# Patient Record
Sex: FEMALE | Race: BLACK OR AFRICAN AMERICAN | Employment: OTHER | URBAN - METROPOLITAN AREA
[De-identification: names, ages, dates, MRNs, and addresses within clinical notes are randomized per-mention and may not be internally consistent; named-entity substitution may affect disease eponyms.]

---

## 2017-05-23 ENCOUNTER — OFFICE VISIT (OUTPATIENT)
Dept: ORTHOPEDIC SURGERY | Age: 67
End: 2017-05-23

## 2017-05-23 VITALS
TEMPERATURE: 97.9 F | HEIGHT: 60 IN | WEIGHT: 132 LBS | SYSTOLIC BLOOD PRESSURE: 112 MMHG | DIASTOLIC BLOOD PRESSURE: 75 MMHG | BODY MASS INDEX: 25.91 KG/M2 | HEART RATE: 60 BPM

## 2017-05-23 DIAGNOSIS — M25.512 LEFT SHOULDER PAIN, UNSPECIFIED CHRONICITY: ICD-10-CM

## 2017-05-23 DIAGNOSIS — M75.02 ADHESIVE CAPSULITIS OF LEFT SHOULDER: Primary | ICD-10-CM

## 2017-05-23 RX ORDER — MELOXICAM 15 MG/1
15 TABLET ORAL
Qty: 30 TAB | Refills: 1 | Status: CANCELLED | OUTPATIENT
Start: 2017-05-23

## 2017-05-23 NOTE — PROGRESS NOTES
Reji Michael  1950   Chief Complaint   Patient presents with    Shoulder Pain     Left        HISTORY OF PRESENT ILLNESS  Reji Michael is a 61 y.o. female who presents today for evaluation of left shoulder pain. She rates her pain 7/10 today. Patient was referred by Dr. Joel Lema for further evaluation. She has been experiencing pain since February. She denies injury or trauma. She has pain with overhead reaching. She has a pulley at home and has been using that but it seems to make it worse. She notes pain at night. Not on File     Past Medical History:   Diagnosis Date    Arthritis     Hypertension       Social History     Social History    Marital status:      Spouse name: N/A    Number of children: N/A    Years of education: N/A     Occupational History    Not on file. Social History Main Topics    Smoking status: Never Smoker    Smokeless tobacco: Not on file    Alcohol use No    Drug use: No    Sexual activity: Not on file     Other Topics Concern    Not on file     Social History Narrative    No narrative on file      Past Surgical History:   Procedure Laterality Date    FOOT/TOES SURGERY PROC UNLISTED        History reviewed. No pertinent family history. No current outpatient prescriptions on file. No current facility-administered medications for this visit. REVIEW OF SYSTEM   Patient denies: Weight loss, Fever/Chills, HA, Visual changes, Fatigue, Chest pain, SOB, Abdominal pain, N/V/D/C, Blood in stool or urine, Edema. Pertinent positive as above in HPI. All others were negative    PHYSICAL EXAM:   Visit Vitals    /75    Pulse 60    Temp 97.9 °F (36.6 °C) (Oral)    Ht 4' 11.5\" (1.511 m)    Wt 132 lb (59.9 kg)    BMI 26.21 kg/m2     The patient is a well-developed, well-nourished female   in no acute distress. The patient is alert and oriented times three. The patient is alert and oriented times three.  Mood and affect are normal.  LYMPHATIC: lymph nodes are not enlarged and are within normal limits  SKIN: normal in color and non tender to palpation. There are no bruises or abrasions noted. NEUROLOGICAL: Motor sensory exam is within normal limits. Reflexes are equal bilaterally. There is normal sensation to pinprick and light touch  MUSCULOSKELETAL:  Examination Left shoulder   Skin Intact   AC joint tenderness -   Biceps tenderness -   Forward flexion/Elevation    Active abduction    Glenohumeral abduction 70   External rotation ROM 30   Internal rotation ROM 30   Apprehension -   Husseins Relocation -   Jerk -   Load and Shift -   Obriens -   Speeds -   Impingement sign +   Supraspinatus/Empty Can -, 5/5   External Rotation Strength -, 5/5   Lift Off/Belly Press -, 5/5   Neurovascular Intact        IMAGING: XR of the left shoulder dated 5/23/17 was reviewed and read: Sclerotic changes in the greater tuberosity. IMPRESSION:      ICD-10-CM ICD-9-CM    1. Adhesive capsulitis of left shoulder M75.02 726.0 REFERRAL TO PHYSICAL THERAPY   2. Left shoulder pain, unspecified chronicity M25.512 719.41 AMB POC XRAY, SHOULDER; COMPLETE, 2+        PLAN: Patient is experiencing a significant amount of stiffness in the shoulder. I discussed with her that we need to improve the mobility of the shoulder. She will be referred for a short course of PT. She will take Mobic. I will see her back in 4 weeks for reevaluation. Office note will be sent to the referring provider. Follow-up Disposition: Not on File    Scribed by Donna Bell [facility]) as dictated by Maura Matthews MD    I, Dr. Maura Matthews, confirm that all documentation is accurate.     Maura Matthews M.D.   [facility]

## 2017-06-01 ENCOUNTER — HOSPITAL ENCOUNTER (OUTPATIENT)
Dept: PHYSICAL THERAPY | Age: 67
Discharge: HOME | End: 2017-06-01
Payer: COMMERCIAL

## 2017-06-01 PROCEDURE — 97162 PT EVAL MOD COMPLEX 30 MIN: CPT | Performed by: PHYSICAL THERAPIST

## 2017-06-01 PROCEDURE — 97110 THERAPEUTIC EXERCISES: CPT | Performed by: PHYSICAL THERAPIST

## 2017-06-01 PROCEDURE — 97140 MANUAL THERAPY 1/> REGIONS: CPT | Performed by: PHYSICAL THERAPIST

## 2017-06-01 NOTE — PROGRESS NOTES
PT DAILY TREATMENT NOTE     Patient Name: Valery Armijo  Date:2017  : 1950  [x]  Patient  Verified  Payor: Patrick Toney / Plan: [employer] HMO / Product Type: HMO /    In time:1136  Out time:1215  Total Treatment Time (min): 39  Visit #: 1 of 12    Treatment Area: Adhesive capsulitis of left shoulder [M75.02]    SUBJECTIVE  Pain Level (0-10 scale): 5/10  Any medication changes, allergies to medications, adverse drug reactions, diagnosis change, or new procedure performed?: [x] No    [] Yes (see summary sheet for update)  Subjective functional status/changes:   [] No changes reported  HPI: Pt c/o increased pain and limited ROM in the L shoulder beginning in 2017 w/o known TRAVIS. Reports she woke up one day and couldn't raise her arm. Reports x-rays show frozen shoulder. Reports she was lifting some weights to try and help but that she thinks it may have made it worse. Reports pain increases w/ raising her arm, dressing, and sleeping on the L side. Reports pain decreases w/ rest and not using it. States she is R hand dominant. Denies any previous injection or injury to the L shoulder. Pt is retired and lives with her  who is also attending therapy and needs her help.      OBJECTIVE    Modality rationale: decrease pain and increase tissue extensibility to improve the patients ability to improve mobility and function    Min Type Additional Details    [] Estim:  []Unatt       []IFC  []Premod                        []Other:  []w/ice   []w/heat  Position:  Location:    [] Estim: []Att    []TENS instruct  []NMES                    []Other:  []w/US   []w/ice   []w/heat  Position:  Location:    []  Traction: [] Cervical       []Lumbar                       [] Prone          []Supine                       []Intermittent   []Continuous Lbs:  [] before manual  [] after manual    []  Ultrasound: []Continuous   [] Pulsed                           []1MHz   []3MHz W/cm2:  Location:    []  Iontophoresis with dexamethasone         Location: [] Take home patch   [] In clinic   10 (w/ HEP instruction) []  Ice     [x]  heat  []  Ice massage  []  Laser   []  Anodyne Position: supine  Location: L shoulder    []  Laser with stim  []  Other:  Position:  Location:    []  Vasopneumatic Device Pressure:       [] lo [] med [] hi   Temperature: [] lo [] med [] hi   [] Skin assessment post-treatment:  []intact []redness- no adverse reaction    []redness - adverse reaction:     19 min [x]Eval                  []Re-Eval       10 min Therapeutic Exercise:  [] See flow sheet : instructed in and demo'd HEP, educated on frozen shoulder and expectations for PT   Rationale: increase ROM, increase strength, improve coordination and increase proprioception to improve the patients ability to decrease pain and improve reaching     min Therapeutic Activity:  []  See flow sheet :   Rationale:   to improve the patients ability to       min Neuromuscular Re-education:  []  See flow sheet :   Rationale:   to improve the patients ability to     10 min Manual Therapy:  STJ mobs, STM to parascapular mm, GH jt mobs inf and post, subscap release, manual str into all planes   Rationale: decrease pain, increase ROM, increase tissue extensibility, decrease trigger points and increase postural awareness to improve dressing and reaching     min Gait Training:  ___ feet with ___ device on level surfaces with ___ level of assist   Rationale: With   [] TE   [] TA   [] neuro   [] other: Patient Education: [x] Review HEP    [] Progressed/Changed HEP based on:   [] positioning   [] body mechanics   [] transfers   [] heat/ice application    [] other:      Other Objective/Functional Measures: Pt presents w/ AROM L shoulder flex 110 w/ UT compensation, scap 85 w/ pain, IR to L1 w/ pain, ER to occiput w/ compensations. PROM L shoulder flex 110 deg, scar 90 deg, ER (at 45 deg abd) 45 deg, IR (at 45 deg abd) 30 deg. Increased tightness in the parascapular mm, subscap, UT, LS, and scalenes. Poor GH joint mobility noted. Pain Level (0-10 scale) post treatment: 6-7/10    ASSESSMENT/Changes in Function: Focus on improving AROM and mobility to assist in returning pt to (I) dressing and reaching. Patient will continue to benefit from skilled PT services to modify and progress therapeutic interventions, address functional mobility deficits, address ROM deficits, address strength deficits, analyze and address soft tissue restrictions, analyze and cue movement patterns, analyze and modify body mechanics/ergonomics and instruct in home and community integration to attain remaining goals. [x]  See Plan of Care  []  See progress note/recertification  []  See Discharge Summary         Progress towards goals / Updated goals:  Short Term Goals: To be accomplished in 1 weeks:  1. Pt will be independent and compliant w/ HEP to progress gains in PT. At eval: initiated HEP  Long Term Goals: To be accomplished in 6 weeks:  1. Pt will improve FOTO to > or = to 64 to demo improved function. At eval: FOTO = 41  2. Pt will increase L shoulder PROM to > or = to 150 deg flex and scap for ease w/ improving reaching. At eval: PROM L shoulder flex 110 deg, scap 90 deg  3. Pt will increase L shoulder AROM to > or = to 130 deg flex and scap for ease w/ dressing. At eval: AROM L shoulder flex 110 w/ UT compensation, scap 85 w/ pain  4. Pt will increase L shoulder AROM into ER to T1 w/o compensation for ease w/ doing her hair.    At eval: L shoulder ER to occiput w/ compensations    PLAN  []  Upgrade activities as tolerated     []  Continue plan of care  []  Update interventions per flow sheet       []  Discharge due to:_  [x]  Other: 2x/6 weeks (pt is unable to come 3x/week 2' fixed income)    Raissa Hong, PT 6/1/2017  12:18 PM    Future Appointments  Date Time Provider Brodie Gallardo   6/20/2017 1:20 PM Esperanza Valadez MD KAL Puri

## 2017-06-01 NOTE — PROGRESS NOTES
[facility] - [facility]              [address]        [place], [address] [place]   [phone] fax    Plan of Care/ Statement of Necessity for Physical Therapy Services  Patient name: Carmen Espinoza Start of Care: 2017   Referral source: Leonarda Monge,* : 1950    Medical Diagnosis: Adhesive capsulitis of left shoulder [M75.02]   Onset Date:2017    Treatment Diagnosis: L adhesive capsulitis   Prior Hospitalization: see medical history Provider#: [id number]   Medications: Verified on Patient summary List    Comorbidities: arthritis, HBP   Prior Level of Function: Independent w/ ADLs and reaching, no L shoulder pain, R hand dominant     The Plan of Care and following information is based on the information from the initial evaluation. Assessment/ key information: Pt is a 60 y/o female w/ c/o increased pain and limited ROM in the L shoulder beginning in 2017 w/o known TRAVIS. Reports she woke up one day and couldn't raise her arm. Reports x-rays show frozen shoulder. Reports she was lifting some weights to try and help but that she thinks it may have made it worse. Reports pain increases w/ raising her arm, dressing, and sleeping on the L side. Reports pain decreases w/ rest and not using it. States she is R hand dominant. Denies any previous injection or injury to the L shoulder. Pt is retired and lives with her  who is also attending therapy and needs her help. Pt presents w/ AROM L shoulder flex 110 w/ UT compensation, scap 85 w/ pain, IR to L1 w/ pain, ER to occiput w/ compensations. PROM L shoulder flex 110 deg, scar 90 deg, ER (at 45 deg abd) 45 deg, IR (at 45 deg abd) 30 deg. Increased tightness in the parascapular mm, subscap, UT, LS, and scalenes. Poor GH joint mobility noted. Pt will benefit from skilled PT to address the deficits and progress with pt goals.       Evaluation Complexity History MEDIUM  Complexity : 1-2 comorbidities / personal factors will impact the outcome/ POC ; Examination MEDIUM Complexity : 3 Standardized tests and measures addressing body structure, function, activity limitation and / or participation in recreation  ;Presentation MEDIUM Complexity : Evolving with changing characteristics  ; Clinical Decision Making MEDIUM Complexity : FOTO score of 26-74  Overall Complexity Rating: MEDIUM  Problem List: pain affecting function, decrease ROM, decrease strength, decrease ADL/ functional abilitiies, decrease activity tolerance and decrease flexibility/ joint mobility   Treatment Plan may include any combination of the following: Therapeutic exercise, Therapeutic activities, Neuromuscular re-education, Physical agent/modality, Manual therapy, Patient education and Functional mobility training  Patient / Family readiness to learn indicated by: asking questions, trying to perform skills and interest  Persons(s) to be included in education: patient (P)  Barriers to Learning/Limitations: None  Patient Goal (s): hope for my shoulder to be back to normal  Patient Self Reported Health Status: good  Rehabilitation Potential: good    Short Term Goals: To be accomplished in 1 weeks:  1. Pt will be independent and compliant w/ HEP to progress gains in PT. Long Term Goals: To be accomplished in 6 weeks:  1. Pt will improve FOTO to > or = to 64 to demo improved function. 2. Pt will increase L shoulder PROM to > or = to 150 deg flex and scap for ease w/ improving reaching. 3. Pt will increase L shoulder AROM to > or = to 130 deg flex and scap for ease w/ dressing. 4. Pt will increase L shoulder AROM into ER to T1 w/o compensation for ease w/ doing her hair. Frequency / Duration: Patient to be seen 2 times per week for 6 weeks.     Patient/ Caregiver education and instruction: Diagnosis, prognosis, activity modification and exercises   [x]  Plan of care has been reviewed with HEAVENLY Evans, PT 6/1/2017 3:24 PM  ________________________________________________________________________    I certify that the above Therapy Services are being furnished while the patient is under my care. I agree with the treatment plan and certify that this therapy is necessary.     [facility] Signature:____________________  Date:____________Time: _________    Please sign and return to [facility] - [address]        [place], [facility]   [phone] fax

## 2017-06-06 ENCOUNTER — HOSPITAL ENCOUNTER (OUTPATIENT)
Dept: PHYSICAL THERAPY | Age: 67
Discharge: HOME | End: 2017-06-06
Payer: COMMERCIAL

## 2017-06-06 PROCEDURE — 97110 THERAPEUTIC EXERCISES: CPT

## 2017-06-06 PROCEDURE — 97140 MANUAL THERAPY 1/> REGIONS: CPT

## 2017-06-06 NOTE — PROGRESS NOTES
PT DAILY TREATMENT NOTE 3-16    Patient Name: Jim Tobias  Date:2017  : 1950  [x]  Patient  Verified  Payor: Rossana Baxter / Plan: [employer] HMO / Product Type: HMO /    In time:2:25  Out time:3:24  Total Treatment Time (min): 59  Visit #: 2 of 12    Treatment Area: Adhesive capsulitis of left shoulder [M75.02]    SUBJECTIVE  Pain Level (0-10 scale): 4  Any medication changes, allergies to medications, adverse drug reactions, diagnosis change, or new procedure performed?: [x] No    [] Yes (see summary sheet for update)  Subjective functional status/changes:   [] No changes reported  Patient reports compliance with HEP.      OBJECTIVE  Modality rationale: decrease pain and increase tissue extensibility to improve the patients ability to ease soreness after therapy   Min Type Additional Details    [] Estim:  []Unatt       []IFC  []Premod                        []Other:  []w/ice   []w/heat  Position:  Location:    [] Estim: []Att    []TENS instruct  []NMES                    []Other:  []w/US   []w/ice   []w/heat  Position:  Location:    []  Traction: [] Cervical       []Lumbar                       [] Prone          []Supine                       []Intermittent   []Continuous Lbs:  [] before manual  [] after manual    []  Ultrasound: []Continuous   [] Pulsed                           []1MHz   []3MHz Location:  W/cm2:    []  Iontophoresis with dexamethasone         Location: [] Take home patch   [] In clinic   10 []  Ice     [x]  heat  []  Ice massage  []  Laser   []  Anodyne Position: seated  Location: left shoulder    []  Laser with stim  []  Other: Position:  Location:    []  Vasopneumatic Device Pressure:       [] lo [] med [] hi   Temperature: [] lo [] med [] hi   [x] Skin assessment post-treatment:  [x]intact []redness- no adverse reaction    []redness - adverse reaction:     39 min Therapeutic Exercise:  [x] See flow sheet :   Rationale: increase ROM, increase strength and improve coordination to improve the patients ability to increase ease with ADLs      10 min Manual Therapy:  Left STJ mobs, STM/TPR to paraspcaular musculature and subscap, left GHJ grade II posterior mobs in flexion, grade II inferior mobs in abd,    Rationale: decrease pain, increase ROM and increase tissue extensibility to ease ADL tolerance             With   [] TE   [] TA   [] neuro   [] other: Patient Education: [x] Review HEP    [] Progressed/Changed HEP based on:   [] positioning   [] body mechanics   [] transfers   [] heat/ice application    [] other:      Other Objective/Functional Measures: first follow up session  Maximal cueing to perform pulley's to tolerable range as patient frequently presents with pained facial expression and verbally expresses discomfort, patient with poor compliance, frequently stating \"I can take it\"  Patient intermittently stops mid reps due to c/o pain, therapist provides cueing to hold chicken wing exercise due to patient's distress, however, patient reports \"I can do it\" and proceeds to complete reps despite therapist's instruction  Reports increase in pain with exercises, 7/10    Pain Level (0-10 scale) post treatment: 0    ASSESSMENT/Changes in Function: Initiated POC per flowsheet. Maximal cueing to perform ROM exercises to tolerable range as patient verbally/facially expresses pain. Patient has increase in pain with exercises, however, post modalities, reports no pain. Will continue to focus on increasing ROM for ease of OH reaching. Patient will continue to benefit from skilled PT services to modify and progress therapeutic interventions, address functional mobility deficits, address ROM deficits, address strength deficits, analyze and address soft tissue restrictions, analyze and cue movement patterns, analyze and modify body mechanics/ergonomics and assess and modify postural abnormalities to attain remaining goals.      []  See Plan of Care  []  See progress note/recertification  []  See Discharge Summary         Short Term Goals: To be accomplished in 1 weeks:  1. Pt will be independent and compliant w/ HEP to progress gains in PT. -met per patient report (6/6/2017)     Long Term Goals: To be accomplished in 6 weeks:  1. Pt will improve FOTO to > or = to 64 to demo improved function. 2. Pt will increase L shoulder PROM to > or = to 150 deg flex and scap for ease w/ improving reaching. 3. Pt will increase L shoulder AROM to > or = to 130 deg flex and scap for ease w/ dressing. 4. Pt will increase L shoulder AROM into ER to T1 w/o compensation for ease w/ doing her hair.      PLAN  []  Upgrade activities as tolerated     [x]  Continue plan of care  []  Update interventions per flow sheet       []  Discharge due to:_  []  Other:_      Suni Marquez 6/6/2017  9:18 AM    Future Appointments  Date Time Provider Brodie Gallardo   6/6/2017 2:30 PM Suni Marquez [facility]   6/9/2017 12:00 PM Evelynlorie Gardner, PTA [facility]   6/13/2017 9:30 AM Leesa Joshi, PTA [facility]   6/16/2017 12:00 PM Leesa Joshi PTA [facility]   6/20/2017 1:20 PM [facility]   6/23/2017 12:00 PM Evelyn E Laws, PTA [facility]   6/27/2017 9:30 AM [facility] PT SUFFOLK 1 [facility]   6/29/2017 11:00 AM Evelyn E Laws, PTA [facility]   7/3/2017 10:30 AM Evelyn E Laws, PTA [facility]   7/7/2017 10:30 AM Evelyn E Laws, PTA [facility]

## 2017-06-09 ENCOUNTER — HOSPITAL ENCOUNTER (OUTPATIENT)
Dept: PHYSICAL THERAPY | Age: 67
Discharge: HOME | End: 2017-06-09
Payer: COMMERCIAL

## 2017-06-09 PROCEDURE — 97140 MANUAL THERAPY 1/> REGIONS: CPT

## 2017-06-09 PROCEDURE — 97110 THERAPEUTIC EXERCISES: CPT

## 2017-06-09 NOTE — PROGRESS NOTES
PT DAILY TREATMENT NOTE     Patient Name: Cb Cohen  Date:2017  : 1950  [x]  Patient  Verified  Payor: Lyric Montemayor / Plan: [employer] HMO / Product Type: HMO /    In time: 11:53  Out time:12:56  Total Treatment Time (min): 63  Visit #: 3 of 12    Treatment Area: Adhesive capsulitis of left shoulder [M75.02]    SUBJECTIVE  Pain Level (0-10 scale): 5/10  Any medication changes, allergies to medications, adverse drug reactions, diagnosis change, or new procedure performed?: [x] No    [] Yes (see summary sheet for update)  Subjective functional status/changes:   [] No changes reported  Pt reports increased pain with movement. OBJECTIVE    Modality rationale: decrease pain to improve the patients ability to increase ease of ADLs.     Min Type Additional Details    [] Estim:  []Unatt       []IFC  []Premod                        []Other:  []w/ice   []w/heat  Position:  Location:    [] Estim: []Att    []TENS instruct  []NMES                    []Other:  []w/US   []w/ice   []w/heat  Position:  Location:    []  Traction: [] Cervical       []Lumbar                       [] Prone          []Supine                       []Intermittent   []Continuous Lbs:  [] before manual  [] after manual    []  Ultrasound: []Continuous   [] Pulsed                           []1MHz   []3MHz W/cm2:  Location:    []  Iontophoresis with dexamethasone         Location: [] Take home patch   [] In clinic   10 []  Ice     [x]  heat  []  Ice massage  []  Laser   []  Anodyne Position: supine  Location: L shoulder    []  Laser with stim  []  Other:  Position:  Location:    []  Vasopneumatic Device Pressure:       [] lo [] med [] hi   Temperature: [] lo [] med [] hi   [] Skin assessment post-treatment:  []intact []redness- no adverse reaction    []redness - adverse reaction:     43 min Therapeutic Exercise:  [x] See flow sheet :   Rationale: increase ROM and increase strength to improve the patients ability to perform ADLs. 10 min Manual Therapy:  Per flow sheet   Rationale: decrease pain, increase ROM and increase tissue extensibility to increase ease of ADLs. With   [] TE   [] TA   [] neuro   [] other: Patient Education: [x] Review HEP    [] Progressed/Changed HEP based on:   [] positioning   [] body mechanics   [] transfers   [] heat/ice application    [] other:      Other Objective/Functional Measures: Frequent VCs to decrease muscle guarding during PROM . Restricted scapular mobility. Pain Level (0-10 scale) post treatment: 5/10    ASSESSMENT/Changes in Function: Cont per POC. Patient will continue to benefit from skilled PT services to modify and progress therapeutic interventions, address functional mobility deficits, address ROM deficits and address strength deficits to attain remaining goals. []  See Plan of Care  []  See progress note/recertification  []  See Discharge Summary         Progress towards goals / Updated goals:  Short Term Goals: To be accomplished in 1 weeks:  1. Pt will be independent and compliant w/ HEP to progress gains in PT. At eval: initiated HEP  Current: Met.  6/6/17  Long Term Goals: To be accomplished in 6 weeks:  1. Pt will improve FOTO to > or = to 64 to demo improved function. At eval: FOTO = 41  2. Pt will increase L shoulder PROM to > or = to 150 deg flex and scap for ease w/ improving reaching. At eval: PROM L shoulder flex 110 deg, scap 90 deg  3. Pt will increase L shoulder AROM to > or = to 130 deg flex and scap for ease w/ dressing. At eval: AROM L shoulder flex 110 w/ UT compensation, scap 85 w/ pain  4. Pt will increase L shoulder AROM into ER to T1 w/o compensation for ease w/ doing her hair.    At eval: L shoulder ER to occiput w/ compensations    PLAN  []  Upgrade activities as tolerated     [x]  Continue plan of care  []  Update interventions per flow sheet       []  Discharge due to:_  []  Other:_      Evelyn Gardner, PTA 6/9/2017  1:07 PM    Future Appointments  Date Time Provider Brodie Gallardo   6/13/2017 9:30 AM Verner Seals, PTA [facility]   6/16/2017 12:00 PM Verner Seals, PTA [facility]   6/20/2017 1:20 PM Zabrina Dawson MD [facility]   6/23/2017 12:00 PM Evelyn Gardner, PTA [facility]   6/27/2017 9:30 AM [facility] PT [place] [facility]   6/29/2017 11:00 AM Evelyn Gardner, PTA [facility]   7/3/2017 10:30 AM Evelyn Gardner, PTA [facility]   7/7/2017 10:30 AM Evelyn Gardner, PTA [facility]

## 2017-06-13 ENCOUNTER — HOSPITAL ENCOUNTER (OUTPATIENT)
Dept: PHYSICAL THERAPY | Age: 67
Discharge: HOME | End: 2017-06-13
Payer: COMMERCIAL

## 2017-06-13 PROCEDURE — 97110 THERAPEUTIC EXERCISES: CPT

## 2017-06-13 PROCEDURE — 97140 MANUAL THERAPY 1/> REGIONS: CPT

## 2017-06-13 NOTE — PROGRESS NOTES
PT DAILY TREATMENT NOTE     Patient Name: Ho Calloway  Date:2017  : 1950  [x]  Patient  Verified  Payor: Noah Peña / Plan: [employer] HMO / Product Type: HMO /    In time:9:30  Out time:10:25  Total Treatment Time (min): 55  Visit #: 4 of 12    Treatment Area: Adhesive capsulitis of left shoulder [M75.02]    SUBJECTIVE  Pain Level (0-10 scale): 5  Any medication changes, allergies to medications, adverse drug reactions, diagnosis change, or new procedure performed?: [x] No    [] Yes (see summary sheet for update)  Subjective functional status/changes:   [] No changes reported  Pt reports that she feels that she can reach a little higher than she could before. OBJECTIVE    Modality rationale: decrease pain to improve the patients ability to decrease difficulty while performing tasks.     Min Type Additional Details    [] Estim:  []Unatt       []IFC  []Premod                        []Other:  []w/ice   []w/heat  Position:  Location:    [] Estim: []Att    []TENS instruct  []NMES                    []Other:  []w/US   []w/ice   []w/heat  Position:  Location:    []  Traction: [] Cervical       []Lumbar                       [] Prone          []Supine                       []Intermittent   []Continuous Lbs:  [] before manual  [] after manual    []  Ultrasound: []Continuous   [] Pulsed                           []1MHz   []3MHz W/cm2:  Location:    []  Iontophoresis with dexamethasone         Location: [] Take home patch   [] In clinic   10 []  Ice     [x]  heat  []  Ice massage  []  Laser   []  Anodyne Position:sitting  Location:L shoulder    []  Laser with stim  []  Other:  Position:  Location:    []  Vasopneumatic Device Pressure:       [] lo [] med [] hi   Temperature: [] lo [] med [] hi   [] Skin assessment post-treatment:  []intact []redness- no adverse reaction    []redness - adverse reaction:     30 min Therapeutic Exercise:  [x] See flow sheet :   Rationale: increase ROM and increase strength to improve the patients ability to increase tolerance to activites. 15 min Manual Therapy:  Per flow sheet   Rationale: decrease pain, increase ROM, increase tissue extensibility and decrease trigger points to increase ease with ADLs. With   [] TE   [] TA   [] neuro   [] other: Patient Education: [x] Review HEP    [] Progressed/Changed HEP based on:   [] positioning   [] body mechanics   [] transfers   [] heat/ice application    [] other:      Other Objective/Functional Measures: L shoulder PROM flex 150 deg, scap 150 deg with pain. Pain Level (0-10 scale) post treatment: 5    ASSESSMENT/Changes in Function: Pt has minimal discomfort with supine wand in Abd. Continue per POC. Patient will continue to benefit from skilled PT services to modify and progress therapeutic interventions, address functional mobility deficits, address ROM deficits, address strength deficits and analyze and address soft tissue restrictions to attain remaining goals. []  See Plan of Care  []  See progress note/recertification  []  See Discharge Summary         Progress towards goals / Updated goals:  Short Term Goals: To be accomplished in 1 weeks:  1. Pt will be independent and compliant w/ HEP to progress gains in PT. At eval: initiated HEP  Current: Met. 6/6/17  Long Term Goals: To be accomplished in 6 weeks:  1. Pt will improve FOTO to > or = to 64 to demo improved function. At eval: FOTO = 41  2. Pt will increase L shoulder PROM to > or = to 150 deg flex and scap for ease w/ improving reaching. At eval: PROM L shoulder flex 110 deg, scap 90 deg  Current; met:  L shoulder PROM flex 150 deg, scap 150 deg with pain. 6/13/17  3. Pt will increase L shoulder AROM to > or = to 130 deg flex and scap for ease w/ dressing. At eval: AROM L shoulder flex 110 w/ UT compensation, scap 85 w/ pain  4.  Pt will increase L shoulder AROM into ER to T1 w/o compensation for ease w/ doing her hair.    At eval: L shoulder ER to occiput w/ compensations    PLAN  []  Upgrade activities as tolerated     [x]  Continue plan of care  []  Update interventions per flow sheet       []  Discharge due to:_  []  Other:_      Anabel Mcdonouhg, PTA 6/13/2017  9:45 AM    Future Appointments  Date Time Provider Brodie Gallardo   6/16/2017 12:00 PM Anabel Mcdonough, PTA [facility]   6/20/2017 1:20 PM Mechelle Au MD [facility]   6/23/2017 12:00 PM Evelyn E Laws, PTA [facility]   6/27/2017 9:30 AM [facility] PT [place] [facility]   6/29/2017 11:00 AM Evelyn E Laws, PTA [facility]   7/3/2017 10:30 AM Evelyn E Laws, PTA [facility]   7/7/2017 10:30 AM Evelyn E Laws, PTA [facility]

## 2017-06-16 ENCOUNTER — HOSPITAL ENCOUNTER (OUTPATIENT)
Dept: PHYSICAL THERAPY | Age: 67
Discharge: HOME | End: 2017-06-16
Payer: COMMERCIAL

## 2017-06-16 PROCEDURE — 97110 THERAPEUTIC EXERCISES: CPT

## 2017-06-16 PROCEDURE — 97140 MANUAL THERAPY 1/> REGIONS: CPT

## 2017-06-16 NOTE — PROGRESS NOTES
PT DAILY TREATMENT NOTE     Patient Name: Carmella Mohan  Date:2017  : 1950  [x]  Patient  Verified  Payor: Michele Hayward / Plan: [employer] / Product Type: HMO /    In time:11:43  Out time:12:36  Total Treatment Time (min): 53  Visit #: 5 of 12    Treatment Area: Adhesive capsulitis of left shoulder [M75.02]    SUBJECTIVE  Pain Level (0-10 scale): 4  Any medication changes, allergies to medications, adverse drug reactions, diagnosis change, or new procedure performed?: [x] No    [] Yes (see summary sheet for update)  Subjective functional status/changes:   [] No changes reported  Pt reports that she was not too sore after last visit. OBJECTIVE    Modality rationale: decrease pain to improve the patients ability to decrease difficulty while performing tasks.     Min Type Additional Details    [] Estim:  []Unatt       []IFC  []Premod                        []Other:  []w/ice   []w/heat  Position:  Location:    [] Estim: []Att    []TENS instruct  []NMES                    []Other:  []w/US   []w/ice   []w/heat  Position:  Location:    []  Traction: [] Cervical       []Lumbar                       [] Prone          []Supine                       []Intermittent   []Continuous Lbs:  [] before manual  [] after manual    []  Ultrasound: []Continuous   [] Pulsed                           []1MHz   []3MHz W/cm2:  Location:    []  Iontophoresis with dexamethasone         Location: [] Take home patch   [] In clinic   10 []  Ice     [x]  heat  []  Ice massage  []  Laser   []  Anodyne Position:supine  Location: L shoulder    []  Laser with stim  []  Other:  Position:  Location:    []  Vasopneumatic Device Pressure:       [] lo [] med [] hi   Temperature: [] lo [] med [] hi   [] Skin assessment post-treatment:  []intact []redness- no adverse reaction    []redness - adverse reaction:       28 min Therapeutic Exercise: [x] See flow sheet :   Rationale: increase ROM and increase strength to improve the patients ability to increase tolerance to activites.         15 min Manual Therapy: Per flow sheet   Rationale: decrease pain, increase ROM, increase tissue extensibility and decrease trigger points to increase ease with ADLs. With   [] TE   [] TA   [] neuro   [] other: Patient Education: [x] Review HEP    [] Progressed/Changed HEP based on:   [] positioning   [] body mechanics   [] transfers   [] heat/ice application    [] other:      Other Objective/Functional Measures: Pt reports relief after manual stretching. Pain Level (0-10 scale) post treatment: 3    ASSESSMENT/Changes in Function: Continue per POC. Patient will continue to benefit from skilled PT services to modify and progress therapeutic interventions, address functional mobility deficits, address ROM deficits, address strength deficits and analyze and address soft tissue restrictions to attain remaining goals. []  See Plan of Care  []  See progress note/recertification  []  See Discharge Summary         Progress towards goals / Updated goals:  Short Term Goals: To be accomplished in 1 weeks:  1. Pt will be independent and compliant w/ HEP to progress gains in PT. At eval: initiated HEP  Current: Met. 6/6/17  Long Term Goals: To be accomplished in 6 weeks:  1. Pt will improve FOTO to > or = to 64 to demo improved function. At eval: FOTO = 41  2. Pt will increase L shoulder PROM to > or = to 150 deg flex and scap for ease w/ improving reaching. At eval: PROM L shoulder flex 110 deg, scap 90 deg  Current; met: L shoulder PROM flex 150 deg, scap 150 deg with pain. 6/13/17  3. Pt will increase L shoulder AROM to > or = to 130 deg flex and scap for ease w/ dressing. At eval: AROM L shoulder flex 110 w/ UT compensation, scap 85 w/ pain  4. Pt will increase L shoulder AROM into ER to T1 w/o compensation for ease w/ doing her hair.    At eval: L shoulder ER to occiput w/ compensations       PLAN  []  Upgrade activities as tolerated     [x]  Continue plan of care  []  Update interventions per flow sheet       []  Discharge due to:_  []  Other:_      Twan Acharya, PTA 6/16/2017  12:09 PM    Future Appointments  Date Time Provider Brodie Paulina   6/20/2017 1:20 PM MD [facility] Stephens   6/23/2017 12:00 PM Evelyn Gardner, PTA [facility]   6/27/2017 9:30 AM Zac Lozano PT [facility]   6/29/2017 11:00 AM Evelynlorie Gardner, PTA [facility]   7/3/2017 10:30 AM Evelynlorie Gardner, PTA [facility]   7/7/2017 10:30 AM Evelynlorie Gardner, PTA [facility]

## 2017-06-20 ENCOUNTER — OFFICE VISIT (OUTPATIENT)
Dept: ORTHOPEDIC SURGERY | Age: 67
End: 2017-06-20

## 2017-06-20 VITALS
DIASTOLIC BLOOD PRESSURE: 78 MMHG | BODY MASS INDEX: 26.11 KG/M2 | TEMPERATURE: 98.2 F | WEIGHT: 133 LBS | SYSTOLIC BLOOD PRESSURE: 118 MMHG | HEIGHT: 60 IN

## 2017-06-20 DIAGNOSIS — M75.02 ADHESIVE CAPSULITIS OF LEFT SHOULDER: Primary | ICD-10-CM

## 2017-06-20 DIAGNOSIS — M25.512 LEFT SHOULDER PAIN, UNSPECIFIED CHRONICITY: ICD-10-CM

## 2017-06-20 NOTE — PROGRESS NOTES
Cb Cohen  1950   Chief Complaint   Patient presents with    Shoulder Pain     Left        HISTORY OF PRESENT ILLNESS  Cb Cohen is a 61 y.o. female who presents today for reevaluation of left shoulder pain. She rates her pain 4/10 today. Patient has attended about 4 sessions of PT with some improvement in her mobility. She does feel her pain has improved. She notes pain since February. She denies injury or trauma. She has pain with overhead reaching. She has a pulley at home and has been using that but it seems to make it worse. She notes pain at night. Patient denies any fever, chills, chest pain, shortness of breath or calf pain. There are no new illness or injuries to report since last seen in the office. There are no changes to medications, allergies, family or social history. PHYSICAL EXAM:   Visit Vitals    /78    Temp 98.2 °F (36.8 °C) (Oral)    Ht 4' 11.5\" (1.511 m)    Wt 133 lb (60.3 kg)    BMI 26.41 kg/m2     The patient is a well-developed, well-nourished female   in no acute distress. The patient is alert and oriented times three. The patient is alert and oriented times three. Mood and affect are normal.  LYMPHATIC: lymph nodes are not enlarged and are within normal limits  SKIN: normal in color and non tender to palpation. There are no bruises or abrasions noted. NEUROLOGICAL: Motor sensory exam is within normal limits. Reflexes are equal bilaterally.  There is normal sensation to pinprick and light touch  MUSCULOSKELETAL:  Examination Left shoulder   Skin Intact   AC joint tenderness -   Biceps tenderness -   Forward flexion/Elevation    Active abduction    Glenohumeral abduction 70   External rotation ROM 30   Internal rotation ROM 30   Apprehension -   Husseins Relocation -   Jerk -   Load and Shift -   Obriens -   Speeds -   Impingement sign +   Supraspinatus/Empty Can -, 5/5   External Rotation Strength -, 5/5   Lift Off/Belly Press -, 5/5   Neurovascular Intact          IMAGING: XR of the left shoulder dated 5/23/17 was reviewed and read: Sclerotic changes in the greater tuberosity. IMPRESSION:      ICD-10-CM ICD-9-CM    1. Adhesive capsulitis of left shoulder M75.02 726.0    2. Left shoulder pain, unspecified chronicity M25.512 719.41         PLAN: Patient has been responding well to treatment at PT. Patient does not want to continue going to PT. She will do HEP. I discussed with her that this may be something that slowly resolves over time. She will return as needed. Follow-up Disposition: Not on File    Scribed by Nicole Jerome [facility]) as dictated by Sheridan Boggs MD    I, Dr. Sheridan Boggs, confirm that all documentation is accurate.     Sheridan Boggs M.D.   [facility]

## 2017-06-20 NOTE — PATIENT INSTRUCTIONS
Joint Pain: Care Instructions  Your Care Instructions  Many people have small aches and pains from overuse or injury to muscles and joints. Joint injuries often happen during sports or recreation, work tasks, or projects around the home. An overuse injury can happen when you put too much stress on a joint or when you do an activity that stresses the joint over and over, such as using the computer or rowing a boat. You can take action at home to help your muscles and joints get better. You should feel better in 1 to 2 weeks, but it can take 3 months or more to heal completely. Follow-up care is a key part of your treatment and safety. Be sure to make and go to all appointments, and call your doctor if you are having problems. It's also a good idea to know your test results and keep a list of the medicines you take. How can you care for yourself at home? · Do not put weight on the injured joint for at least a day or two. · For the first day or two after an injury, do not take hot showers or baths, and do not use hot packs. The heat could make swelling worse. · Put ice or a cold pack on the sore joint for 10 to 20 minutes at a time. Try to do this every 1 to 2 hours for the next 3 days (when you are awake) or until the swelling goes down. Put a thin cloth between the ice and your skin. · Wrap the injury in an elastic bandage. Do not wrap it too tightly because this can cause more swelling. · Prop up the sore joint on a pillow when you ice it or anytime you sit or lie down during the next 3 days. Try to keep it above the level of your heart. This will help reduce swelling. · Take an over-the-counter pain medicine, such as acetaminophen (Tylenol), ibuprofen (Advil, Motrin), or naproxen (Aleve). Read and follow all instructions on the label. · After 1 or 2 days of rest, begin moving the joint gently.  While the joint is still healing, you can begin to exercise using activities that do not strain or hurt the painful joint. When should you call for help? Call your doctor now or seek immediate medical care if:  · You have signs of infection, such as:  ¨ Increased pain, swelling, warmth, and redness. ¨ Red streaks leading from the joint. ¨ A fever. Watch closely for changes in your health, and be sure to contact your doctor if:  · Your movement or symptoms are not getting better after 1 to 2 weeks of home treatment. Where can you learn more? Go to http://david-naheed.info/. Enter P205 in the search box to learn more about \"Joint Pain: Care Instructions. \"  Current as of: March 21, 2017  Content Version: 11.3  © 8252-6580 [employer]. Care instructions adapted under license by [employer] (which disclaims liability or warranty for this information). If you have questions about a medical condition or this instruction, always ask your healthcare professional. [address] any warranty or liability for your use of this information.

## 2017-06-23 ENCOUNTER — HOSPITAL ENCOUNTER (OUTPATIENT)
Dept: PHYSICAL THERAPY | Age: 67
Discharge: HOME | End: 2017-06-23
Payer: COMMERCIAL

## 2017-06-23 PROCEDURE — 97110 THERAPEUTIC EXERCISES: CPT

## 2017-06-23 PROCEDURE — 97140 MANUAL THERAPY 1/> REGIONS: CPT

## 2017-06-23 NOTE — PROGRESS NOTES
PT DAILY TREATMENT NOTE     Patient Name: Alissa Guerrero  Date:2017  : 1950  [x]  Patient  Verified  Payor: Yuliana Knox / Plan: [employer] / Product Type: HMO /    In time: 11:57  Out time: 12:54  Total Treatment Time (min): 57  Visit #: 6 of 12    Treatment Area: Adhesive capsulitis of left shoulder [M75.02]    SUBJECTIVE  Pain Level (0-10 scale): 4/10  Any medication changes, allergies to medications, adverse drug reactions, diagnosis change, or new procedure performed?: [x] No    [] Yes (see summary sheet for update)  Subjective functional status/changes:   [] No changes reported  Pt reports she can feel an improvement. OBJECTIVE    Modality rationale: decrease pain to improve the patients ability to increase ease of ADLs.     Min Type Additional Details    [] Estim:  []Unatt       []IFC  []Premod                        []Other:  []w/ice   []w/heat  Position:  Location:    [] Estim: []Att    []TENS instruct  []NMES                    []Other:  []w/US   []w/ice   []w/heat  Position:  Location:    []  Traction: [] Cervical       []Lumbar                       [] Prone          []Supine                       []Intermittent   []Continuous Lbs:  [] before manual  [] after manual    []  Ultrasound: []Continuous   [] Pulsed                           []1MHz   []3MHz W/cm2:  Location:    []  Iontophoresis with dexamethasone         Location: [] Take home patch   [] In clinic   10 []  Ice     [x]  heat  []  Ice massage  []  Laser   []  Anodyne Position: seated  Location: L shoulder    []  Laser with stim  []  Other:  Position:  Location:    []  Vasopneumatic Device Pressure:       [] lo [] med [] hi   Temperature: [] lo [] med [] hi   [] Skin assessment post-treatment:  []intact []redness- no adverse reaction    []redness - adverse reaction:     35 min Therapeutic Exercise:  [x] See flow sheet :   Rationale: increase ROM and increase strength to improve the patients ability to perform ADLs. 12 min Manual Therapy:  Per flow sheet   Rationale: decrease pain, increase ROM and increase tissue extensibility to increase ease of ADLs. With   [] TE   [] TA   [] neuro   [] other: Patient Education: [x] Review HEP    [] Progressed/Changed HEP based on:   [] positioning   [] body mechanics   [] transfers   [] heat/ice application    [] other:      Other Objective/Functional Measures:  FOTO 59.     Pain Level (0-10 scale) post treatment: 1/10    ASSESSMENT/Changes in Function: Cont per POC. Patient will continue to benefit from skilled PT services to modify and progress therapeutic interventions, address functional mobility deficits, address ROM deficits and address strength deficits to attain remaining goals. []  See Plan of Care  []  See progress note/recertification  []  See Discharge Summary         Progress towards goals / Updated goals:  Short Term Goals: To be accomplished in 1 weeks:  1. Pt will be independent and compliant w/ HEP to progress gains in PT. At eval: initiated HEP  Current: Met. 6/6/17  Long Term Goals: To be accomplished in 6 weeks:  1. Pt will improve FOTO to > or = to 64 to demo improved function. At [facility]: FOTO = 41  Current: Progressing, FOTO 59, an increase of 18 since [facility].  6/23/17  2. Pt will increase L shoulder PROM to > or = to 150 deg flex and scap for ease w/ improving reaching. At [facility]: PROM L shoulder flex 110 deg, scap 90 deg  Current; met: L shoulder PROM flex 150 deg, scap 150 deg with pain. 6/13/17  3. Pt will increase L shoulder AROM to > or = to 130 deg flex and scap for ease w/ dressing. At eval: AROM L shoulder flex 110 w/ UT compensation, scap 85 w/ pain  4. Pt will increase L shoulder AROM into ER to T1 w/o compensation for ease w/ doing her hair.    At eval: L shoulder ER to occiput w/ compensations    PLAN  []  Upgrade activities as tolerated     [x]  Continue plan of care  []  Update interventions per flow sheet []  Discharge due to:_  []  Other:_      Ofelia Melendez, HEAVENLY 6/23/2017  12:10 PM    Future Appointments  Date Time Provider Brodie Gallardo   6/27/2017 9:30 AM Krzysztof Yadav, PT [facility]   6/29/2017 11:00 AM Evelyn Gardner, HEAVENLY [facility]   7/3/2017 10:30 AM Krzysztof Yadav, PT [facility]   7/7/2017 10:30 AM Evelyn Gardner, PTA [facility]

## 2017-06-27 ENCOUNTER — HOSPITAL ENCOUNTER (OUTPATIENT)
Dept: PHYSICAL THERAPY | Age: 67
Discharge: HOME | End: 2017-06-27
Payer: COMMERCIAL

## 2017-06-27 PROCEDURE — 97140 MANUAL THERAPY 1/> REGIONS: CPT

## 2017-06-27 PROCEDURE — 97110 THERAPEUTIC EXERCISES: CPT

## 2017-06-27 NOTE — PROGRESS NOTES
PT DAILY TREATMENT NOTE     Patient Name: Brea Lynn  Date:2017  : 1950  [x]  Patient  Verified  Payor: Dania Lynn / Plan: [employer] / Product Type: HMO /    In time:929  Out time:  Total Treatment Time (min): 54  Visit #: 7 of 12    Treatment Area: Adhesive capsulitis of left shoulder [M75.02]    SUBJECTIVE  Pain Level (0-10 scale): 4/10  Any medication changes, allergies to medications, adverse drug reactions, diagnosis change, or new procedure performed?: [x] No    [] Yes (see summary sheet for update)  Subjective functional status/changes:   [] No changes reported  Patient reports continued difficulty with overhead elevation with shirt donning/doffing. She reports an overall improvement in overhead functional elevation. OBJECTIVE    Modality rationale: decrease pain and increase tissue extensibility to improve the patients ability to utilize L UE with functional elevation with ADLs   Min Type Additional Details   10 []  Ice     [x]  heat  []  Ice massage Position: Reclined   Location: L shoulder, Post-Tx     36 min Therapeutic Exercise:  [x] See flow sheet : Emphasis placed on improving L UE AROM and functional strength   Rationale: increase ROM and increase strength to improve the patients ability to utilize L UE without modification with performance of self-care ADLs. 8 min Manual Therapy:    Supine L Glenohumeral Inferior Grade II Mobilization, x2'  Supine L Glenohumeral AP Grade II Mobilization, x2'  Supine L Glenohumeral Lateral Grade III Mobilization, x2'  R Sidelying L Infrascapular Fossa Manual TrP release, x2'   Rationale: decrease pain, increase ROM and increase tissue extensibility to utilize L UE with performance of household ADLs.     With   [] TE   [] TA   [] neuro   [] other: Patient Education: [x] Review HEP    [] Progressed/Changed HEP based on:   [] positioning   [] body mechanics   [] transfers   [] heat/ice application    [] other: Other Objective/Functional Measures:     Shoulder ER MMT: 4/5  Shoulder Abduction MMT: 4+/5 (Shoulder shrug compensation)     Pain Level (0-10 scale) post treatment: 0/10    ASSESSMENT/Changes in Function: Secondary to patient subjective report of infrascapular pain with patient noted with palpable trigger points to this region manual TrP release performed with patient subjectively reporting reduced pain afterwards. Progressed to performance of abduction wall slides with medial arm \"stretching\" reported questioning shoulder adductor tightness. Patient tolerated progression of exercise well with initiation of performance of sidelying ER/abduction secondary to weakness demonstrated with compensation. Patient will continue to benefit from skilled PT services to modify and progress therapeutic interventions, address functional mobility deficits, address ROM deficits, address strength deficits, analyze and address soft tissue restrictions and analyze and cue movement patterns to attain remaining goals. []  See Plan of Care  []  See progress note/recertification  []  See Discharge Summary         Progress towards goals / Updated goals:    Short Term Goals: To be accomplished in 1 weeks:  1. Pt will be independent and compliant w/ HEP to progress gains in PT. At eval: initiated HEP  Current: Met. 6/6/17  Long Term Goals: To be accomplished in 6 weeks:  1. Pt will improve FOTO to > or = to 64 to demo improved function. At [facility]: FOTO = 41  Current: Progressing, FOTO 59, an increase of 18 since [facility].  6/23/17  2. Pt will increase L shoulder PROM to > or = to 150 deg flex and scap for ease w/ improving reaching. At eval: PROM L shoulder flex 110 deg, scap 90 deg  Current; met: L shoulder PROM flex 150 deg, scap 150 deg with pain. 6/13/17  3. Pt will increase L shoulder AROM to > or = to 130 deg flex and scap for ease w/ dressing.    At eval: AROM L shoulder flex 110 w/ UT compensation, scap 85 w/ pain  4. Pt will increase L shoulder AROM into ER to T1 w/o compensation for ease w/ doing her hair.    At eval: L shoulder ER to occiput w/ compensations  Current: Progressing, Able to reach mid-cervical region without compensation, 6/27/2017       PLAN  [x]  Upgrade activities as tolerated     [x]  Continue plan of care  []  Update interventions per flow sheet       []  Discharge due to:_  []  Other:_      Erwin Medrano PT 6/27/2017  9:17 AM    Future Appointments  Date Time Provider Brodie Gallardo   6/27/2017 9:30 AM Erwin Medrano, LAUREN [facility]   6/29/2017 11:00 AM Evelyn Gardner PTA [facility]   7/3/2017 10:30 AM Erwin Medrano PT [facility]   7/7/2017 10:30 AM Evelyn Gardner PTA [facility]

## 2017-06-29 ENCOUNTER — HOSPITAL ENCOUNTER (OUTPATIENT)
Dept: PHYSICAL THERAPY | Age: 67
Discharge: HOME | End: 2017-06-29
Payer: COMMERCIAL

## 2017-06-29 PROCEDURE — 97110 THERAPEUTIC EXERCISES: CPT

## 2017-06-29 PROCEDURE — 97140 MANUAL THERAPY 1/> REGIONS: CPT

## 2017-06-29 NOTE — PROGRESS NOTES
PT DAILY TREATMENT NOTE     Patient Name: Rick Ceballos  Date:2017  : 1950  [x]  Patient  Verified  Payor: Nathalia Wisdom / Plan: [employer] / Product Type: HMO /    In time: 11:00  Out time:11:46  Total Treatment Time (min): 46  Visit #: 8 of 12    Treatment Area: Adhesive capsulitis of left shoulder [M75.02]    SUBJECTIVE  Pain Level (0-10 scale): 4-5/10  Any medication changes, allergies to medications, adverse drug reactions, diagnosis change, or new procedure performed?: [x] No    [] Yes (see summary sheet for update)  Subjective functional status/changes:   [] No changes reported  Pt states she has been sore since last visit. OBJECTIVE    Modality rationale: decrease pain to improve the patients ability to increase ease of ADLs.     Min Type Additional Details    [] Estim:  []Unatt       []IFC  []Premod                        []Other:  []w/ice   []w/heat  Position:  Location:    [] Estim: []Att    []TENS instruct  []NMES                    []Other:  []w/US   []w/ice   []w/heat  Position:  Location:    []  Traction: [] Cervical       []Lumbar                       [] Prone          []Supine                       []Intermittent   []Continuous Lbs:  [] before manual  [] after manual    []  Ultrasound: []Continuous   [] Pulsed                           []1MHz   []3MHz W/cm2:  Location:    []  Iontophoresis with dexamethasone         Location: [] Take home patch   [] In clinic   10 []  Ice     [x]  heat  []  Ice massage  []  Laser   []  Anodyne Position: supine  Location: L shoulder    []  Laser with stim  []  Other:  Position:  Location:    []  Vasopneumatic Device Pressure:       [] lo [] med [] hi   Temperature: [] lo [] med [] hi   [] Skin assessment post-treatment:  []intact []redness- no adverse reaction    []redness - adverse reaction:     24 min Therapeutic Exercise:  [x] See flow sheet :   Rationale: increase ROM and increase strength to improve the patients ability to perform ADLs. 12 min Manual Therapy:  Per flow sheet   Rationale: decrease pain, increase ROM and increase tissue extensibility to increase ease of ADLs. With   [] TE   [] TA   [] neuro   [] other: Patient Education: [x] Review HEP    [] Progressed/Changed HEP based on:   [] positioning   [] body mechanics   [] transfers   [] heat/ice application    [] other:      Other Objective/Functional Measures:  AROM: flex 125 degrees, scap 108 degrees. Pain Level (0-10 scale) post treatment: 0/10    ASSESSMENT/Changes in Function: Cont per POC. Patient will continue to benefit from skilled PT services to modify and progress therapeutic interventions, address functional mobility deficits, address ROM deficits and address strength deficits to attain remaining goals. []  See Plan of Care  []  See progress note/recertification  []  See Discharge Summary         Progress towards goals / Updated goals:  Short Term Goals: To be accomplished in 1 weeks:  1. Pt will be independent and compliant w/ HEP to progress gains in PT. At [facility]: initiated HEP  Current: Met. 6/6/17  Long Term Goals: To be accomplished in 6 weeks:  1. Pt will improve FOTO to > or = to 64 to demo improved function. At [facility]: FOTO = 41  Current: Progressing, FOTO 59, an increase of 18 since [facility].  6/23/17  2. Pt will increase L shoulder PROM to > or = to 150 deg flex and scap for ease w/ improving reaching. At [facility]: PROM L shoulder flex 110 deg, scap 90 deg  Current; met: L shoulder PROM flex 150 deg, scap 150 deg with pain. 6/13/17  3. Pt will increase L shoulder AROM to > or = to 130 deg flex and scap for ease w/ dressing. At [facility]: AROM L shoulder flex 110 w/ UT compensation, scap 85 w/ pain  Current: Progressing, flex 125 degrees, scap 108 degrees. 6/29/17  4. Pt will increase L shoulder AROM into ER to T1 w/o compensation for ease w/ doing her hair.    At [facility]: L shoulder ER to occiput w/ compensations  Current: Progressing, Able to reach mid-cervical region without compensation, 6/27/2017    PLAN  []  Upgrade activities as tolerated     [x]  Continue plan of care  []  Update interventions per flow sheet       []  Discharge due to:_  []  Other:_      Hank Briseno, HEAVENLY 6/29/2017  12:28 PM    Future Appointments  Date Time Provider Brodie Gallardo   7/3/2017 10:30 AM Shannen Saba PT [facility] [address]   7/7/2017 10:30 AM Evelyn Gardner PTA [facility] [address]

## 2017-07-03 ENCOUNTER — HOSPITAL ENCOUNTER (OUTPATIENT)
Dept: PHYSICAL THERAPY | Age: 67
Discharge: HOME | End: 2017-07-03
Payer: COMMERCIAL

## 2017-07-03 PROCEDURE — 97140 MANUAL THERAPY 1/> REGIONS: CPT

## 2017-07-03 PROCEDURE — 97110 THERAPEUTIC EXERCISES: CPT

## 2017-07-03 NOTE — PROGRESS NOTES
[facility]              [address]        [place], [address]   [phone] fax    Progress Note  Patient name: Rick Ceballos Start of Care: 2017   Referral source: Amitaphilippayla Esterheaven,* : 1950   Medical/Treatment Diagnosis: Adhesive capsulitis of left shoulder [M75.02] Onset Date:2017     Prior Hospitalization: see medical history Provider#: [id number]   Medications: Verified on Patient Summary List     Comorbidities: arthritis, HBP   Prior Level of Function: Independent w/ ADLs and reaching, no L shoulder pain, R hand dominant  Visits from Start of Care: 8    Missed Visits: 0    Established Goals:          Short Term Goals: To be accomplished in 1 weeks:  1. Pt will be independent and compliant w/ HEP to progress gains in PT. At eval: initiated HEP  At PN: Met. Long Term Goals: To be accomplished in 6 weeks:  1. Pt will improve FOTO to > or = to 64 to demo improved function. At eval: FOTO = 41  At PN: Progressing, FOTO 59, an increase of 18 since [facility]  2. Pt will increase L shoulder PROM to > or = to 150 deg flex and scap for ease w/ improving reaching. At eval: PROM L shoulder flex 110 deg, scap 90 deg  At PN: Met: L shoulder PROM flex 150 deg, scap 150 deg with pain. 3. Pt will increase L shoulder AROM to > or = to 130 deg flex and scap for ease w/ dressing. At eval: AROM L shoulder flex 110 w/ UT compensation, scap 85 w/ pain  At PN: Progressing, flex 125 degrees, scap 108 degrees. 4. Pt will increase L shoulder AROM into ER to T1 w/o compensation for ease w/ doing her hair.    At eval: L shoulder ER to occiput w/ compensations  At PN: Progressing, Able to reach mid-cervical region without compensation    Key Functional Changes:     Shoulder ROM:  [] Unable to assess at this time                                     AROM                                             PROM   Left  Left   Flexion 125 Flexion 150   Scaption 108 Scaption 150     Updated Goals: Unmet goals to be continued    ASSESSMENT/RECOMMENDATIONS:    Since [facility] patient has demonstrated an 18 point improvement in FOTO with patient subjectively reporting a 70% improvement in condition with improved ease with donning/doffing shirts and performance of self-care ADLs with the L UE. Patient denies sleep disturbances but continues to subjectively report discomfort with L sidelying. Patient reports pain at worst over the last week 5/10 with pain medication usage reported PRN.     Patient will continue to benefit from skilled PT services to modify and progress therapeutic interventions, address functional mobility deficits, address ROM deficits, address strength deficits, analyze and address soft tissue restrictions and analyze and cue movement patterns to attain remaining goals. Patient with desire to be seen for last scheduled appointment of 7/7/2017 with discharge to prescribed HEP at this time.      [x]Continue therapy per initial plan/protocol: Patient to be seen for one more follow up with discharge to HEP at this time.   []Continue therapy with the following recommended changes:_____________________      _____________________________________________________________________  []Discontinue therapy progressing towards or have reached established goals  []Discontinue therapy due to lack of appreciable progress towards goals  []Discontinue therapy due to lack of attendance or compliance  []Await Physician's recommendations/decisions regarding therapy  []Other:________________________________________________________________    Thank you for this referral.    Samantha Martin, PT 7/3/2017 6:49 AM  NOTE TO PHYSICIAN:  PLEASE COMPLETE THE ORDERS BELOW AND   FAX TO [facility]: [phone]  If you are unable to process this request in 24 hours please contact our office: [phone]    []  I have read the above report and request that my patient continue as recommended. []  I have read the above report and request that my patient continue therapy with the following changes/special instructions:________________________________________  []I have read the above report and request that my patient be discharged from therapy.     Physicians signature: ________________________________Date: _____Time:_____

## 2017-07-03 NOTE — PROGRESS NOTES
PT DAILY TREATMENT NOTE     Patient Name: Ivan Rubin  Date:7/3/2017  : 1950  [x]  Patient  Verified  Payor: Miladys Foster / Plan: [employer] / Product Type: HMO /    In time:1029  Out time:1125  Total Treatment Time (min): 56  Visit #: 9 of 12    Treatment Area: Adhesive capsulitis of left shoulder [M75.02]    SUBJECTIVE  Pain Level (0-10 scale): 410  Any medication changes, allergies to medications, adverse drug reactions, diagnosis change, or new procedure performed?: [x] No    [] Yes (see summary sheet for update)  Subjective functional status/changes:   [] No changes reported  Patient reports since [facility] a 70% improvement in overall condition with patient reporting greatest limitations with horizontal L UE adduction with improvement with shoulder extension. OBJECTIVE    Modality rationale: decrease inflammation, decrease pain and increase tissue extensibility to improve the patients ability to drive with diminished discomfort.    Min Type Additional Details   10 []  Ice     [x]  heat  []  Ice massage  []  Laser   []  Anodyne Position: Reclined  Location: L Shoulder, Post-Tx     38 min Therapeutic Exercise:  [x] See flow sheet : Emphasis placed on improving L shoulder AROM and strength of the glenohumeral and scapulothoracic musculature   Rationale: increase ROM and increase strength to improve the patients ability to perform household ADLs without compensation    8 min Manual Therapy:    Supine L Glenohumeral Inferior Grade II Mobilization, x2'  Supine L Glenohumeral AP Grade II Mobilization, x2'  Supine L Glenohumeral Lateral Grade III Mobilization, x2'  R Sidelying L Infrascapular Fossa Manual TrP release, x2'   Rationale: decrease pain, increase ROM and increase tissue extensibility to improve ability to don/doff bra without compensation          With   [] TE   [] TA   [] neuro   [] other: Patient Education: [x] Review HEP    [] Progressed/Changed HEP based on: [] positioning   [] body mechanics   [] transfers   [] heat/ice application    [] other:      Pain Level (0-10 scale) post treatment: 0/10    ASSESSMENT/Changes in Function: Since [facility] patient has demonstrated an 18 point improvement in FOTO with patient subjectively reporting a 70% improvement in condition with improved ease with donning/doffing shirts and performance of self-care ADLs with the L UE. Patient denies sleep disturbances but continues to subjectively report discomfort with L sidelying. Patient reports pain at worst over the last week 5/10 with pain medication usage reported PRN. Patient will continue to benefit from skilled PT services to modify and progress therapeutic interventions, address functional mobility deficits, address ROM deficits, address strength deficits, analyze and address soft tissue restrictions and analyze and cue movement patterns to attain remaining goals. Patient with desire to be seen for last scheduled appointment of 7/7/2017 with discharge to prescribed HEP at this time. []  See Plan of Care  [x]  See progress note/recertification  []  See Discharge Summary         Progress towards goals / Updated goals:    Short Term Goals: To be accomplished in 1 weeks:  1. Pt will be independent and compliant w/ HEP to progress gains in PT. At eval: initiated HEP  Current: Met. 6/6/17  Long Term Goals: To be accomplished in 6 weeks:  1. Pt will improve FOTO to > or = to 64 to demo improved function. At [facility]: FOTO = 41  Current: Progressing, FOTO 59, an increase of 18 since [facility].  6/23/17  2. Pt will increase L shoulder PROM to > or = to 150 deg flex and scap for ease w/ improving reaching. At [facility]: PROM L shoulder flex 110 deg, scap 90 deg  Current; Met: L shoulder PROM flex 150 deg, scap 150 deg with pain. 6/13/17  3. Pt will increase L shoulder AROM to > or = to 130 deg flex and scap for ease w/ dressing.    At [facility]: AROM L shoulder flex 110 w/ UT compensation, scap 85 w/ pain  Current: Progressing, flex 125 degrees, scap 108 degrees. 6/29/17  4. Pt will increase L shoulder AROM into ER to T1 w/o compensation for ease w/ doing her hair.    At eval: L shoulder ER to occiput w/ compensations  Current: Progressing, Able to reach mid-cervical region without compensation, 6/27/2017    PLAN  [x]  Upgrade activities as tolerated     [x]  Continue plan of care  []  Update interventions per flow sheet       []  Discharge due to:_  []  Other:_      Samantha Martin, PT 7/3/2017  6:48 AM    Future Appointments  Date Time Provider Brodie Gallardo   7/3/2017 10:30 AM Samantha Martin PT [facility]   7/7/2017 10:30 AM Evelyn Gardner PTA [facility]

## 2017-07-07 ENCOUNTER — HOSPITAL ENCOUNTER (OUTPATIENT)
Dept: PHYSICAL THERAPY | Age: 67
Discharge: HOME | End: 2017-07-07
Payer: COMMERCIAL

## 2017-07-07 PROCEDURE — 97110 THERAPEUTIC EXERCISES: CPT

## 2017-07-07 NOTE — PROGRESS NOTES
PT DAILY TREATMENT NOTE     Patient Name: Rick Ceballos  Date:2017  : 1950  [x]  Patient  Verified  Payor: Nathalia Wisdom / Plan: [employer] / Product Type: HMO /    In time: 10:27  Out time:11:19  Total Treatment Time (min): 52  Visit #: 10 of 12    Treatment Area: Adhesive capsulitis of left shoulder [M75.02]    SUBJECTIVE  Pain Level (0-10 scale): 410  Any medication changes, allergies to medications, adverse drug reactions, diagnosis change, or new procedure performed?: [x] No    [] Yes (see summary sheet for update)  Subjective functional status/changes:   [] No changes reported  Pt states she is feeling better but she will continue to work it at home. OBJECTIVE    Modality rationale: decrease pain to improve the patients ability to increase ease of ADLs.     Min Type Additional Details    [] Estim:  []Unatt       []IFC  []Premod                        []Other:  []w/ice   []w/heat  Position:  Location:    [] Estim: []Att    []TENS instruct  []NMES                    []Other:  []w/US   []w/ice   []w/heat  Position:  Location:    []  Traction: [] Cervical       []Lumbar                       [] Prone          []Supine                       []Intermittent   []Continuous Lbs:  [] before manual  [] after manual    []  Ultrasound: []Continuous   [] Pulsed                           []1MHz   []3MHz W/cm2:  Location:    []  Iontophoresis with dexamethasone         Location: [] Take home patch   [] In clinic   10 []  Ice     [x]  heat  []  Ice massage  []  Laser   []  Anodyne Position:  reclined  Location: L shoulder    []  Laser with stim  []  Other:  Position:  Location:    []  Vasopneumatic Device Pressure:       [] lo [] med [] hi   Temperature: [] lo [] med [] hi   [] Skin assessment post-treatment:  []intact []redness- no adverse reaction    []redness - adverse reaction:     42 min Therapeutic Exercise:  [x] See flow sheet :   Rationale: increase ROM and increase strength to improve the patients ability to increase ease of ADLs. With   [] TE   [] TA   [] neuro   [] other: Patient Education: [x] Review HEP    [] Progressed/Changed HEP based on:   [] positioning   [] body mechanics   [] transfers   [] heat/ice application    [] other:      Other Objective/Functional Measures: See goals. Pain Level (0-10 scale) post treatment: 2/10    ASSESSMENT/Changes in Function: Pt has made good progress with PT. FOTO has increased by 24 since last assessment. PROM has increased to 150 degrees with flex and scap. AROM has increased to 122 degrees with flex and 115 degrees with scaption. Functional ER has increased, middle finger reaches to T1.      []  See Plan of Care  []  See progress note/recertification  [x]  See Discharge Summary         Progress towards goals / Updated goals:  Short Term Goals: To be accomplished in 1 weeks:  1. Pt will be independent and compliant w/ HEP to progress gains in PT. At [facility]: initiated HEP  Current: Met. 6/6/17  Long Term Goals: To be accomplished in 6 weeks:  1. Pt will improve FOTO to > or = to 64 to demo improved function. At [facility]: FOTO = 41  Current: Met, FOTO 65, an increase of 24 since [facility].  7/7/17  2. Pt will increase L shoulder PROM to > or = to 150 deg flex and scap for ease w/ improving reaching. At [facility]: PROM L shoulder flex 110 deg, scap 90 deg  Current; Met: L shoulder PROM flex 150 deg, scap 150 deg with pain. 6/13/17  3. Pt will increase L shoulder AROM to > or = to 130 deg flex and scap for ease w/ dressing. At [facility]: AROM L shoulder flex 110 w/ UT compensation, scap 85 w/ pain  Current: Progressing, flex 122 degrees, scap 115 degrees.  7/7/17  4. Pt will increase L shoulder AROM into ER to T1 w/o compensation for ease w/ doing her hair.    At [facility]: L shoulder ER to occiput w/ compensations  Current: Met, middle finger reaches T1. 7/7/2017    PLAN  []  Upgrade activities as tolerated     [x]  Continue plan of care  []  Update interventions per flow sheet       []  Discharge due to:_  []  Other:_      Evelyn Gardner, PTA 7/7/2017  11:35 AM    No future appointments.

## 2018-02-20 NOTE — PROGRESS NOTES
[facility]              [address]        [place], [address]   [phone] fax    Discharge Summary  Patient name: Yoly Garcia Start of Care: 17   Referral source: Petr Austin,* : 1950   Medical/Treatment Diagnosis: Adhesive capsulitis of left shoulder [M75.02] Onset Date:2017     Prior Hospitalization: see medical history Provider#: [id number]   Medications: Verified on Patient Summary List    Comorbidities: arthritis, HBP   Prior Level of Function: Independent w/ ADLs and reaching, no L shoulder pain, R hand dominant  Visits from Start of Care: 10    Missed Visits: 0  Reporting Period : 17 to 17    Summary of Care:  Progress towards goals / Updated goals:  Short Term Goals: To be accomplished in 1 weeks:  1. Pt will be independent and compliant w/ HEP to progress gains in PT. At eval: initiated HEP  Current: Met  Long Term Goals: To be accomplished in 6 weeks:  1. Pt will improve FOTO to > or = to 64 to demo improved function. At [facility]: FOTO = 41  Current: Met, FOTO 65, an increase of 24 since [facility]  2. Pt will increase L shoulder PROM to > or = to 150 deg flex and scap for ease w/ improving reaching. At [facility]: PROM L shoulder flex 110 deg, scap 90 deg  Current; Met: L shoulder PROM flex 150 deg, scap 150 deg with pain  3. Pt will increase L shoulder AROM to > or = to 130 deg flex and scap for ease w/ dressing. At [facility]: AROM L shoulder flex 110 w/ UT compensation, scap 85 w/ pain  Current: Progressing, flex 122 degrees, scap 115 degrees  4. Pt will increase L shoulder AROM into ER to T1 w/o compensation for ease w/ doing her hair. At [facility]: L shoulder ER to occiput w/ compensations  Current: Met, middle finger reaches T1    Pt has made good progress with PT. FOTO has increased by 24 since last assessment. PROM has increased to 150 degrees with flex and scap.  AROM has increased to 122 degrees with flex and 115 degrees with scaption. Functional ER has increased, middle finger reaches to T1. DC to HEP at this time.      ASSESSMENT/RECOMMENDATIONS:  [x]Discontinue therapy: [x]Patient has reached or is progressing toward set goals      []Patient is non-compliant or has abdicated      []Due to lack of appreciable progress towards set Michelet Curiel, PT 7/7/2017 1:46 PM immune

## 2020-03-06 ENCOUNTER — OFFICE VISIT (OUTPATIENT)
Dept: ORTHOPEDIC SURGERY | Age: 70
End: 2020-03-06

## 2020-03-06 VITALS
TEMPERATURE: 98.5 F | OXYGEN SATURATION: 100 % | SYSTOLIC BLOOD PRESSURE: 128 MMHG | HEART RATE: 67 BPM | BODY MASS INDEX: 26.42 KG/M2 | HEIGHT: 60 IN | DIASTOLIC BLOOD PRESSURE: 83 MMHG | RESPIRATION RATE: 16 BRPM | WEIGHT: 134.6 LBS

## 2020-03-06 DIAGNOSIS — M25.551 RIGHT HIP PAIN: ICD-10-CM

## 2020-03-06 DIAGNOSIS — M54.50 LUMBAR PAIN: Primary | ICD-10-CM

## 2020-03-06 DIAGNOSIS — M70.61 TROCHANTERIC BURSITIS, RIGHT HIP: ICD-10-CM

## 2020-03-06 RX ORDER — LATANOPROST 50 UG/ML
1 SOLUTION/ DROPS OPHTHALMIC
COMMUNITY

## 2020-03-06 RX ORDER — AMLODIPINE BESYLATE 5 MG/1
5 TABLET ORAL DAILY
COMMUNITY

## 2020-03-06 RX ORDER — BETAMETHASONE DIPROPIONATE 0.5 MG/G
OINTMENT TOPICAL 2 TIMES DAILY
COMMUNITY

## 2020-03-06 RX ORDER — BETAMETHASONE SODIUM PHOSPHATE AND BETAMETHASONE ACETATE 3; 3 MG/ML; MG/ML
6 INJECTION, SUSPENSION INTRA-ARTICULAR; INTRALESIONAL; INTRAMUSCULAR; SOFT TISSUE ONCE
Qty: 1 ML | Refills: 0
Start: 2020-03-06 | End: 2020-03-06

## 2020-03-06 RX ORDER — MELOXICAM 15 MG/1
15 TABLET ORAL DAILY
COMMUNITY

## 2020-03-06 RX ORDER — TIMOLOL MALEATE 6.8 MG/ML
1 SOLUTION/ DROPS OPHTHALMIC DAILY
COMMUNITY

## 2020-03-06 RX ORDER — LOVASTATIN 40 MG/1
40 TABLET ORAL
COMMUNITY

## 2020-03-06 NOTE — PROGRESS NOTES
Patient: Henry Powell               MRN: [id number]   SSN: [id number]  YOB: 1950       AGE: 81 y.o. SEX: female  Body mass index is Body mass index is 26.73 kg/m². PCP: Lashay Javier MD  03/06/20      HISTORY OF PRESENT ILLNESS:  We had the pleasure of seeing Ms. Reyes Hartmann in office today for complaints of right hip pain. She is sore over the outside of the hip, and cannot roll over on the hip at night. She also reports classical symptoms of spinal stenosis, including lumbar pain and pain radiating down her lower extremities when she stands for a long period of time that is only relieved by sitting. REVIEW OF SYSTEMS:  Twelve point review of systems performed today. Pertinent positives noted, all other systems reviewed negative. PHYSICAL EXAMINATION:  On examination today, both hips rotate well. The lower back is quite tender. On the right side she is sore over the greater trochanter and the hip flexor tendon. Both feet are warm and well perfused, calf non-tender, no evidence for infection or DVT. There is a moderate numbness L4, more severe L5. EHL 5-/5. RADIOGRAPHS:  AP pelvis and AP and lateral xrays of the lumbar spine taken today, 03/06/20, in our [facility] office reveals a significant spondylolithesis L4/L5 type 1, moderate DDD. There is mild arthritis in the right hip, the left hip is relatively benign. IMPRESSION:  Ms. Reyes aHrtmann has trochanteric bursitis in her right hip, for which she would like a cortisone injection which I am happy to provide. I also suspect that she has a pinched nerve in her back due to her lumbar and radicular pain. I will send her to my partners at the spine center for further evaluation. PROCEDURE:  Under aseptic conditions and after informed written consent with time out, right trochanteric bursa injected with 1 mL Celestone preparation, i.e. 6 mg.        [facility]  OFFICE PROCEDURE PROGRESS NOTE      Chart reviewed for the following:  I, [place] Ferrell Snellen, M.D., have reviewed the History, Physical and updated the Allergic reactions for Sunil Shine? TIME OUT performed immediately prior to start of procedure:  I, [place] Ferrell Snellen, M.D., have performed the following reviews on Sunil Shine prior to the start of the procedure:  ????????  * Patient was identified by name and date of birth   * Agreement on procedure being performed was verified  * Risks and Benefits explained to the patient  * Procedure site verified and marked as necessary  * Patient was positioned for comfort  * Consent was signed and verified    Time: 2:05 PM    Body part: Right Hip Trochanteric Bursa    Medication & Dose: 6mg Celestone    Date of procedure: 03/06/20    Procedure performed by: Larwence Stack. Ferrell Snellen, M.D., [facility])    Provider assisted by: RT Sal(R)    Patient assisted by: self    How tolerated by patient: tolerated the procedure well with no complications    Post Procedural Pain Scale: 4    Comments: none      REVIEW OF SYSTEMS  Review of Systems   Constitutional: Negative. HENT: Negative. Eyes: Negative. Respiratory: Negative. Cardiovascular: Negative. Gastrointestinal: Negative. Genitourinary: Negative. Musculoskeletal: Positive for back pain and joint pain. Skin: Negative. Neurological: Negative. Endo/Heme/Allergies: Negative. Psychiatric/Behavioral: Negative. MEDICAL HISTORY  Past Medical History:   Diagnosis Date    Arthritis     Hypertension        SURGICAL HISTORY  Past Surgical History:   Procedure Laterality Date    FOOT/TOES SURGERY PROC UNLISTED         CURRENT MEDICATIONS  Current Outpatient Medications   Medication Sig Dispense Refill    latanoprost (XALATAN) 0.005 % ophthalmic solution Administer 1 Drop to both eyes nightly.  timolol maleate 0.5 % drpd ophthalmic solution Administer 1 Drop to both eyes daily.       meloxicam (MOBIC) 15 mg tablet Take 15 mg by mouth daily.  amLODIPine (NORVASC) 5 mg tablet Take 5 mg by mouth daily.  lovastatin (MEVACOR) 40 mg tablet Take 40 mg by mouth nightly.  augmented betamethasone dipropionate (DIPROLENE-AF) 0.05 % ointment Apply  to affected area two (2) times a day.  besifloxacin (BESIVANCE) 0.6 % drps ophthalmic suspension 1 Drop three (3) times daily. ALLERGIES  Allergies   Allergen Reactions    Iodine Swelling       FAMILHY HISTORY  [unfilled]      SOCIAL HISTORY  Social History     Socioeconomic History    Marital status:      Spouse name: Not on file    Number of children: Not on file    Years of education: Not on file    Highest education level: Not on file   Tobacco Use    Smoking status: Never Smoker    Smokeless tobacco: Never Used   Substance and Sexual Activity    Alcohol use: No    Drug use: No       VISIT VITALS  Visit Vitals  /83 (BP 1 Location: Right arm, BP Patient Position: Sitting)   Pulse 67   Temp 98.5 °F (36.9 °C) (Oral)   Resp 16   Ht 4' 11.5\" (1.511 m)   Wt 134 lb 9.6 oz (61.1 kg)   SpO2 100%   BMI 26.73 kg/m²       Pain Assessment  3/6/2020   Location of Pain Hip; Leg   Location Modifiers Right   Severity of Pain 4   Quality of Pain Aching; Throbbing   Quality of Pain Comment giving out   Duration of Pain Persistent   Frequency of Pain Constant   Aggravating Factors Stairs; Walking;Standing;Squatting;Kneeling;Straightening;Stretching;Bending   Limiting Behavior -   Relieving Factors Nothing   Result of Injury No         Written by [place] Nena Vallejo as dictated by Leana Mclaughlin MD.

## 2020-09-03 ENCOUNTER — OFFICE VISIT (OUTPATIENT)
Dept: ORTHOPEDIC SURGERY | Age: 70
End: 2020-09-03

## 2020-09-03 VITALS
RESPIRATION RATE: 15 BRPM | HEART RATE: 69 BPM | WEIGHT: 137.4 LBS | HEIGHT: 60 IN | BODY MASS INDEX: 26.97 KG/M2 | SYSTOLIC BLOOD PRESSURE: 155 MMHG | DIASTOLIC BLOOD PRESSURE: 83 MMHG | TEMPERATURE: 97.7 F

## 2020-09-03 DIAGNOSIS — M70.61 GREATER TROCHANTERIC BURSITIS OF RIGHT HIP: Primary | ICD-10-CM

## 2020-09-03 RX ORDER — BETAMETHASONE SODIUM PHOSPHATE AND BETAMETHASONE ACETATE 3; 3 MG/ML; MG/ML
6 INJECTION, SUSPENSION INTRA-ARTICULAR; INTRALESIONAL; INTRAMUSCULAR; SOFT TISSUE ONCE
Qty: 1 ML | Refills: 0
Start: 2020-09-03 | End: 2020-09-03

## 2020-09-03 NOTE — PROGRESS NOTES
[address] [place] [address]  [phone]           Patient: Javan Collins                MRN: [id number]       SSN: [id number]  YOB: 1950        AGE: 81 y.o. SEX: female  Body mass index is 27.29 kg/m². PCP: Mehdi Alonzo MD  09/03/20            REVIEW OF SYSTEMS:  Constitutional: Negative for fever, chills, weight loss and malaise/fatigue. HENT: Negative. Eyes: Negative. Respiratory: Negative. Cardiovascular: Negative. Gastrointestinal: No bowel incontinence or constipation. Genitourinary: No bladder incontinence or saddle anesthesia. Skin: Negative. Neurological: Negative. Endo/Heme/Allergies: Negative. Psychiatric/Behavioral: Negative. Musculoskeletal: As per HPI above. Past Medical History:   Diagnosis Date    Arthritis     Hypertension          Current Outpatient Medications:     latanoprost (XALATAN) 0.005 % ophthalmic solution, Administer 1 Drop to both eyes nightly., Disp: , Rfl:     timolol maleate 0.5 % drpd ophthalmic solution, Administer 1 Drop to both eyes daily. , Disp: , Rfl:     meloxicam (MOBIC) 15 mg tablet, Take 15 mg by mouth daily. , Disp: , Rfl:     amLODIPine (NORVASC) 5 mg tablet, Take 5 mg by mouth daily. , Disp: , Rfl:     lovastatin (MEVACOR) 40 mg tablet, Take 40 mg by mouth nightly., Disp: , Rfl:     augmented betamethasone dipropionate (DIPROLENE-AF) 0.05 % ointment, Apply  to affected area two (2) times a day., Disp: , Rfl:     besifloxacin (BESIVANCE) 0.6 % drps ophthalmic suspension, 1 Drop three (3) times daily. , Disp: , Rfl:     Allergies   Allergen Reactions    Iodine Swelling       Social History     Socioeconomic History    Marital status:      Spouse name: Not on file    Number of children: Not on file    Years of education: Not on file    Highest education level: Not on file   Occupational History    Not on file   Social Needs  Financial resource strain: Not on file    Food insecurity     Worry: Not on file     Inability: Not on file   [place] [email or web address] needs     Medical: Not on file     Non-medical: Not on file   Tobacco Use    Smoking status: Never Smoker    Smokeless tobacco: Never Used   Substance and Sexual Activity    Alcohol use: No    Drug use: No    Sexual activity: Not on file   Lifestyle    Physical activity     Days per week: Not on file     Minutes per session: Not on file    Stress: Not on file   Relationships    Social connections     Talks on phone: Not on file     Gets together: Not on file     Attends [employer] service: Not on file     Active member of club or organization: Not on file     Attends meetings of clubs or organizations: Not on file     Relationship status: Not on file    Intimate partner violence     Fear of current or ex partner: Not on file     Emotionally abused: Not on file     Physically abused: Not on file     Forced sexual activity: Not on file   Other Topics Concern    Not on file   Social History Narrative    Not on file       Past Surgical History:   Procedure Laterality Date    FOOT/TOES SURGERY PROC UNLISTED         Patient seen and evaluated today for her right hip. She has lateral based discomfort of her right hip. She does have a history of trochanteric bursitis. She had an injection by Dr. Stanislav Medina about 6 months ago gave her good relief. It is began to bother her again. She has lateral based discomfort of her right hip worse when lying on the right side at night as well as with ambulation. She denies any groin pain or radiating pain down the lower extremities. Patient denies recent fevers, chills, chest pain, SOB, or injuries. No recent systemic changes noted. A 12-point review of systems is performed today. Pertinent positives are noted. All other systems reviewed and otherwise are negative.     Physical exam: General: Alert and oriented x3, nad.  well-developed, well nourished. normal affect, AF. NC/AT, EOMI, neck supple, trachea midline, no JVD present. Breathing is non-labored. Examination of lower extremities reveals pain-free range of motion the hips. She does have discomfort to palpation the trochanter bursa on the right side. Negative straight leg raise. Negative calf tenderness. Negative Homans. No signs of DVT present. Assessment: Right hip trochanteric bursitis    Plan: At this point, we discussed treatment options. We will move for the cortisone injection for the right hip today. After informed consent, under aseptic conditions, the right hip was prepped with betadine and 6mg of celestone was injected without complications. The patient tolerated the injection well. The patient is instructed on post-injection care. We will see her back in 3 months time for evaluation. Chart reviewed for the following:  I, Carlin Krabbe, PA-C, have reviewed the History, Physical and updated the Allergic reactions for Leyla Patel?     TIME OUT performed immediately prior to start of procedure:  I, Carlin Krabbe, PA-C, have performed the following reviews on Leyla Patel prior to the start of the procedure:  ????????  * Patient was identified by name and date of birth   * Agreement on procedure being performed was verified  * Risks and Benefits explained to the patient  * Procedure site verified and marked as necessary  * Patient was positioned for comfort  * Consent was signed and verified    Time:2:58 PM    Body part: right hip    Medication & Dose: 6mg celestone    Date of procedure: 09/03/20    Procedure performed by: Carlin Krabbe, PA-C    Provider assisted by: none    Patient assisted by: self    How tolerated by patient: tolerated the procedure well with no complications    Post Procedural Pain Scale: 4    Comments: none       JR Asael LERAM PA-C, ATC

## 2023-05-09 LAB — LEFT VENTRICULAR EJECTION FRACTION, EXTERNAL: NORMAL

## 2023-05-19 ENCOUNTER — OFFICE VISIT (OUTPATIENT)
Age: 73
End: 2023-05-19

## 2023-05-19 VITALS — BODY MASS INDEX: 25.6 KG/M2 | WEIGHT: 127 LBS | HEIGHT: 59 IN

## 2023-05-19 DIAGNOSIS — M70.61 TROCHANTERIC BURSITIS, RIGHT HIP: Primary | ICD-10-CM

## 2023-05-19 RX ORDER — BETAMETHASONE SODIUM PHOSPHATE AND BETAMETHASONE ACETATE 3; 3 MG/ML; MG/ML
6 INJECTION, SUSPENSION INTRA-ARTICULAR; INTRALESIONAL; INTRAMUSCULAR; SOFT TISSUE ONCE
Status: CANCELLED | OUTPATIENT
Start: 2023-05-19 | End: 2023-05-19

## 2023-05-19 RX ORDER — DICLOFENAC EPOLAMINE 0.01 G/1
1 SYSTEM TOPICAL 2 TIMES DAILY
Qty: 60 PATCH | Refills: 2 | Status: SHIPPED | OUTPATIENT
Start: 2023-05-19

## 2023-05-19 RX ORDER — BETAMETHASONE SODIUM PHOSPHATE AND BETAMETHASONE ACETATE 3; 3 MG/ML; MG/ML
6 INJECTION, SUSPENSION INTRA-ARTICULAR; INTRALESIONAL; INTRAMUSCULAR; SOFT TISSUE ONCE
Status: COMPLETED | OUTPATIENT
Start: 2023-05-19 | End: 2023-05-19

## 2023-05-19 RX ADMIN — BETAMETHASONE SODIUM PHOSPHATE AND BETAMETHASONE ACETATE 6 MG: 3; 3 INJECTION, SUSPENSION INTRA-ARTICULAR; INTRALESIONAL; INTRAMUSCULAR; SOFT TISSUE at 14:24

## 2023-05-19 NOTE — PROGRESS NOTES
Patient: Andreea Lopes                MRN: [id number]       SSN: [id number]  YOB: 1950        AGE: 81 y.o. SEX: female  Body mass index is 25.65 kg/m². PCP: Marta Jaime MD  05/19/23            REVIEW OF SYSTEMS:  Constitutional: Negative for fever, chills, weight loss and malaise/fatigue. HENT: Negative. Eyes: Negative. Respiratory: Negative. Cardiovascular: Negative. Gastrointestinal: No bowel incontinence or constipation. Genitourinary: No bladder incontinence or saddle anesthesia. Skin: Negative. Neurological: Negative. Endo/Heme/Allergies: Negative. Psychiatric/Behavioral: Negative. Musculoskeletal: As per HPI above.      Past Medical History:   Diagnosis Date    Arthritis     Hypertension          Current Outpatient Medications:     amLODIPine (NORVASC) 5 MG tablet, Take 5 mg by mouth daily, Disp: , Rfl:     besifloxacin (BESIVANCE) 0.6 % SUSP, 1 drop 3 times daily, Disp: , Rfl:     augmented betamethasone dipropionate (DIPROLENE-AF) 0.05 % ointment, Apply topically 2 times daily, Disp: , Rfl:     latanoprost (XALATAN) 0.005 % ophthalmic solution, Apply 1 drop to eye, Disp: , Rfl:     lovastatin (MEVACOR) 40 MG tablet, Take 40 mg by mouth, Disp: , Rfl:     meloxicam (MOBIC) 15 MG tablet, Take 15 mg by mouth daily, Disp: , Rfl:     Timolol (TIMOPTIC) 0.5 % SOLN ophthalmic solution, Apply 1 drop to eye daily, Disp: , Rfl:     Allergies   Allergen Reactions    Iodine Swelling       Social History     Socioeconomic History    Marital status:      Spouse name: Not on file    Number of children: Not on file    Years of education: Not on file    Highest education level: Not on file   Occupational History    Not on file   Tobacco Use    Smoking status: Never    Smokeless tobacco: Never   Substance and Sexual Activity    Alcohol use: No    Drug use: No    Sexual activity: Not on file   Other Topics Concern    Not on file   Social History Narrative

## 2023-06-05 ENCOUNTER — TELEPHONE (OUTPATIENT)
Age: 73
End: 2023-06-05

## 2023-06-05 NOTE — TELEPHONE ENCOUNTER
Patient called stating insurance would not cover the patch that was prescribed for her. She is requesting a different medication.      [facility]

## 2023-06-19 RX ORDER — LIDOCAINE 50 MG/G
1 PATCH TOPICAL DAILY
Qty: 30 PATCH | Refills: 0 | Status: SHIPPED | OUTPATIENT
Start: 2023-06-19 | End: 2023-07-19

## 2023-08-17 ENCOUNTER — TELEPHONE (OUTPATIENT)
Age: 73
End: 2023-08-17

## 2023-08-17 NOTE — TELEPHONE ENCOUNTER
Patient called and is asking for some pain medication from [address] [place] [address]. Patient said she is having knee and hip pain. Patient has an appt to see LALO Williamson on 08/29/23. Patient is asking if she could get enough pain medication to last her until she see LALO Williamson on 8/29/23. [facility]   Tel. [phone]    Patient tel. [phone].

## 2023-08-29 ENCOUNTER — OFFICE VISIT (OUTPATIENT)
Age: 73
End: 2023-08-29
Payer: MEDICARE

## 2023-08-29 VITALS — BODY MASS INDEX: 25.65 KG/M2 | HEIGHT: 59 IN

## 2023-08-29 DIAGNOSIS — M17.11 UNILATERAL PRIMARY OSTEOARTHRITIS, RIGHT KNEE: ICD-10-CM

## 2023-08-29 DIAGNOSIS — M70.61 TROCHANTERIC BURSITIS, RIGHT HIP: ICD-10-CM

## 2023-08-29 DIAGNOSIS — M25.561 RIGHT KNEE PAIN, UNSPECIFIED CHRONICITY: Primary | ICD-10-CM

## 2023-08-29 DIAGNOSIS — M17.12 UNILATERAL PRIMARY OSTEOARTHRITIS, LEFT KNEE: ICD-10-CM

## 2023-08-29 PROCEDURE — 20611 DRAIN/INJ JOINT/BURSA W/US: CPT | Performed by: PHYSICIAN ASSISTANT

## 2023-08-29 PROCEDURE — G8400 PT W/DXA NO RESULTS DOC: HCPCS | Performed by: PHYSICIAN ASSISTANT

## 2023-08-29 PROCEDURE — G8419 CALC BMI OUT NRM PARAM NOF/U: HCPCS | Performed by: PHYSICIAN ASSISTANT

## 2023-08-29 PROCEDURE — G8428 CUR MEDS NOT DOCUMENT: HCPCS | Performed by: PHYSICIAN ASSISTANT

## 2023-08-29 PROCEDURE — 99214 OFFICE O/P EST MOD 30 MIN: CPT | Performed by: PHYSICIAN ASSISTANT

## 2023-08-29 PROCEDURE — 1036F TOBACCO NON-USER: CPT | Performed by: PHYSICIAN ASSISTANT

## 2023-08-29 PROCEDURE — 73564 X-RAY EXAM KNEE 4 OR MORE: CPT | Performed by: PHYSICIAN ASSISTANT

## 2023-08-29 PROCEDURE — 1123F ACP DISCUSS/DSCN MKR DOCD: CPT | Performed by: PHYSICIAN ASSISTANT

## 2023-08-29 PROCEDURE — 1090F PRES/ABSN URINE INCON ASSESS: CPT | Performed by: PHYSICIAN ASSISTANT

## 2023-08-29 PROCEDURE — 3017F COLORECTAL CA SCREEN DOC REV: CPT | Performed by: PHYSICIAN ASSISTANT

## 2023-08-29 RX ORDER — BETAMETHASONE SODIUM PHOSPHATE AND BETAMETHASONE ACETATE 3; 3 MG/ML; MG/ML
6 INJECTION, SUSPENSION INTRA-ARTICULAR; INTRALESIONAL; INTRAMUSCULAR; SOFT TISSUE ONCE
Status: COMPLETED | OUTPATIENT
Start: 2023-08-29 | End: 2023-08-29

## 2023-08-29 RX ORDER — MELOXICAM 15 MG/1
15 TABLET ORAL DAILY
Qty: 90 TABLET | Refills: 0 | Status: SHIPPED | OUTPATIENT
Start: 2023-08-29

## 2023-08-29 RX ADMIN — BETAMETHASONE SODIUM PHOSPHATE AND BETAMETHASONE ACETATE 6 MG: 3; 3 INJECTION, SUSPENSION INTRA-ARTICULAR; INTRALESIONAL; INTRAMUSCULAR; SOFT TISSUE at 10:33

## 2023-08-29 NOTE — PROGRESS NOTES
of motion is well-preserved however there is no crepitus on extension on the right side. Radiographs obtained in office today 8/29/2023 at the [facility] location: AP, tunnel, lateral, skyline of the right knee does confirm end-stage arthritis of each of her knees to the medial patellofemoral tubulation. Assessment: Bilateral knee end-stage arthritis    Plan: At this point, we discussed treatment options. Total replacements were discussed and recommended however the patient is not interested in surgery. We will move forward with a cortisone injection for each of her knees today. After informed consent, under aseptic conditions, with US guided assitance, each knee was prepped with betadine and a mxiture of 3ml 1% lidocaine and 6mg of celestone was injected without complications. The patient tolerated the injection well. The patient is instructed on post-injection care. A prescription of Mobic was sent to her pharmacy. She is instructed in use and precautions. We will see her back in the office about 3 months time for evaluation. Chart reviewed for the following:  Denae QUIÑONES PA-C, have reviewed the History, Physical and updated the Allergic reactions for Nancy Sam?     TIME OUT performed immediately prior to start of procedure:  Denae QUIÑONES PA-C, have performed the following reviews on Nancy Sam prior to the start of the procedure:  ????????  * Patient was identified by name and date of birth   * Agreement on procedure being performed was verified  * Risks and Benefits explained to the patient  * Procedure site verified and marked as necessary  * Patient was positioned for comfort  * Consent was signed and verified    Time:10:26 AM    Body part: bilateral knees, intra-articular    Medication & Dose: 3ml 1% lidocaine and 6mg celestone each knee    Date of procedure: 08/29/23    Procedure performed by: Andra Montgomery PA-C    Provider assisted by: none    Patient

## 2023-10-30 DIAGNOSIS — M17.11 UNILATERAL PRIMARY OSTEOARTHRITIS, RIGHT KNEE: ICD-10-CM

## 2023-10-30 DIAGNOSIS — M17.12 UNILATERAL PRIMARY OSTEOARTHRITIS, LEFT KNEE: ICD-10-CM

## 2023-10-30 RX ORDER — MELOXICAM 15 MG/1
15 TABLET ORAL DAILY
Qty: 90 TABLET | Refills: 3 | Status: SHIPPED | OUTPATIENT
Start: 2023-10-30

## 2023-12-01 ENCOUNTER — OFFICE VISIT (OUTPATIENT)
Age: 73
End: 2023-12-01
Payer: MEDICARE

## 2023-12-01 DIAGNOSIS — M17.11 UNILATERAL PRIMARY OSTEOARTHRITIS, RIGHT KNEE: Primary | ICD-10-CM

## 2023-12-01 DIAGNOSIS — M17.12 UNILATERAL PRIMARY OSTEOARTHRITIS, LEFT KNEE: ICD-10-CM

## 2023-12-01 PROCEDURE — 1090F PRES/ABSN URINE INCON ASSESS: CPT | Performed by: PHYSICIAN ASSISTANT

## 2023-12-01 PROCEDURE — 20611 DRAIN/INJ JOINT/BURSA W/US: CPT | Performed by: PHYSICIAN ASSISTANT

## 2023-12-01 PROCEDURE — 3017F COLORECTAL CA SCREEN DOC REV: CPT | Performed by: PHYSICIAN ASSISTANT

## 2023-12-01 PROCEDURE — 99214 OFFICE O/P EST MOD 30 MIN: CPT | Performed by: PHYSICIAN ASSISTANT

## 2023-12-01 PROCEDURE — 1123F ACP DISCUSS/DSCN MKR DOCD: CPT | Performed by: PHYSICIAN ASSISTANT

## 2023-12-01 PROCEDURE — G8427 DOCREV CUR MEDS BY ELIG CLIN: HCPCS | Performed by: PHYSICIAN ASSISTANT

## 2023-12-01 PROCEDURE — G8419 CALC BMI OUT NRM PARAM NOF/U: HCPCS | Performed by: PHYSICIAN ASSISTANT

## 2023-12-01 PROCEDURE — G8400 PT W/DXA NO RESULTS DOC: HCPCS | Performed by: PHYSICIAN ASSISTANT

## 2023-12-01 PROCEDURE — 1036F TOBACCO NON-USER: CPT | Performed by: PHYSICIAN ASSISTANT

## 2023-12-01 PROCEDURE — G8484 FLU IMMUNIZE NO ADMIN: HCPCS | Performed by: PHYSICIAN ASSISTANT

## 2023-12-01 RX ORDER — BETAMETHASONE SODIUM PHOSPHATE AND BETAMETHASONE ACETATE 3; 3 MG/ML; MG/ML
6 INJECTION, SUSPENSION INTRA-ARTICULAR; INTRALESIONAL; INTRAMUSCULAR; SOFT TISSUE ONCE
Status: COMPLETED | OUTPATIENT
Start: 2023-12-01 | End: 2023-12-01

## 2023-12-01 RX ORDER — IBUPROFEN 800 MG/1
800 TABLET ORAL 3 TIMES DAILY PRN
Qty: 270 TABLET | Refills: 0 | Status: SHIPPED | OUTPATIENT
Start: 2023-12-01 | End: 2024-02-29

## 2023-12-01 RX ADMIN — BETAMETHASONE SODIUM PHOSPHATE AND BETAMETHASONE ACETATE 6 MG: 3; 3 INJECTION, SUSPENSION INTRA-ARTICULAR; INTRALESIONAL; INTRAMUSCULAR; SOFT TISSUE at 10:33

## 2023-12-01 NOTE — PROGRESS NOTES
ecchymosis or warmth. There are no signs for infection or cellulitis present. Findings are consistent with advanced arthritis of each of the knees with pain to palpation tricompartmentally and crepitus arising from anterior compartment. Review of previous radiographs does confirm end-stage arthritis of each of her knees. Assessment: Bilateral knee end-stage arthritis    Plan: At this point, we discussed treatment options. We will continue with conservative treatment. Total replacements were discussed and recommended however the patient is not interested at this time. Today in the office we will move forward with a cortisone injection for each of her knees. After informed consent, under aseptic conditions, with US guided assitance, each knee was prepped with betadine and a mxiture of 3ml 1% lidocaine and 6mg of celestone was injected without complications. The patient tolerated the injection well. The patient is instructed on post-injection care. She will continue with home excise program to maintain her mobility and range of motion. A prescription for Motrin 800 be sent to her pharmacy. She is instructed on use and precautions. Care plan outlined and precautions discussed. Radiographs and Results were reviewed with the patient. Medications were reviewed with the patient. All of pt's questions and concerns were addressed. Increasing symptoms and return precautions associated with chief complaint and evaluation were reviewed with the patient in detail. The patient demonstrated adequate understanding. Chart reviewed for the following:  Genaro QUIÑONES PA-C, have reviewed the History, Physical and updated the Allergic reactions for Christoph Pock?     TIME OUT performed immediately prior to start of procedure:  Genaro QUIÑONES PA-C, have performed the following reviews on Oddis Pock prior to the start of the procedure:  ????????  * Patient was identified by name and date

## 2024-01-30 ENCOUNTER — TELEPHONE (OUTPATIENT)
Age: 74
End: 2024-01-30

## 2024-01-30 NOTE — TELEPHONE ENCOUNTER
Patient called and said that LALO Tang had prescribed her some Ibuprofen 800 mg medication.    Patient said that the medication is hard to swallow. That at one point the pill got stuck in her throat and her  had to do the Heimlich Maneuver.    Patient is wondering if LALO Tang could place her on Meloxicam medication , or any other pain medication.     [facility]  Tel. [phone]    Patient would like a call once a new medication is sent in.    Patient tel. [phone].    Note : patient aware LALO Tang is out of the office today.

## 2024-01-31 RX ORDER — MELOXICAM 15 MG/1
15 TABLET ORAL DAILY
Qty: 90 TABLET | Refills: 1 | Status: SHIPPED | OUTPATIENT
Start: 2024-01-31

## 2024-01-31 NOTE — TELEPHONE ENCOUNTER
Patient called again and is now stating that if the Ibuprofen comes in a Gel or Capsule, she would like that instead of the Meloxicam.    Patient tel. [phone].    Note : please see previous messages.

## 2024-02-01 DIAGNOSIS — M17.12 UNILATERAL PRIMARY OSTEOARTHRITIS, LEFT KNEE: ICD-10-CM

## 2024-02-01 DIAGNOSIS — M17.11 UNILATERAL PRIMARY OSTEOARTHRITIS, RIGHT KNEE: ICD-10-CM

## 2024-02-01 RX ORDER — IBUPROFEN 800 MG/1
800 TABLET ORAL 3 TIMES DAILY PRN
Qty: 270 TABLET | Refills: 0 | Status: SHIPPED | OUTPATIENT
Start: 2024-02-01 | End: 2024-05-01

## 2024-03-01 ENCOUNTER — OFFICE VISIT (OUTPATIENT)
Age: 74
End: 2024-03-01
Payer: MEDICARE

## 2024-03-01 VITALS — WEIGHT: 127 LBS | BODY MASS INDEX: 25.6 KG/M2 | HEIGHT: 59 IN

## 2024-03-01 DIAGNOSIS — M17.12 UNILATERAL PRIMARY OSTEOARTHRITIS, LEFT KNEE: ICD-10-CM

## 2024-03-01 DIAGNOSIS — M17.11 UNILATERAL PRIMARY OSTEOARTHRITIS, RIGHT KNEE: Primary | ICD-10-CM

## 2024-03-01 DIAGNOSIS — M70.61 TROCHANTERIC BURSITIS, RIGHT HIP: ICD-10-CM

## 2024-03-01 PROCEDURE — 1090F PRES/ABSN URINE INCON ASSESS: CPT | Performed by: PHYSICIAN ASSISTANT

## 2024-03-01 PROCEDURE — G8484 FLU IMMUNIZE NO ADMIN: HCPCS | Performed by: PHYSICIAN ASSISTANT

## 2024-03-01 PROCEDURE — 1036F TOBACCO NON-USER: CPT | Performed by: PHYSICIAN ASSISTANT

## 2024-03-01 PROCEDURE — 3017F COLORECTAL CA SCREEN DOC REV: CPT | Performed by: PHYSICIAN ASSISTANT

## 2024-03-01 PROCEDURE — 1123F ACP DISCUSS/DSCN MKR DOCD: CPT | Performed by: PHYSICIAN ASSISTANT

## 2024-03-01 PROCEDURE — G8427 DOCREV CUR MEDS BY ELIG CLIN: HCPCS | Performed by: PHYSICIAN ASSISTANT

## 2024-03-01 PROCEDURE — G8419 CALC BMI OUT NRM PARAM NOF/U: HCPCS | Performed by: PHYSICIAN ASSISTANT

## 2024-03-01 PROCEDURE — 99214 OFFICE O/P EST MOD 30 MIN: CPT | Performed by: PHYSICIAN ASSISTANT

## 2024-03-01 PROCEDURE — G8400 PT W/DXA NO RESULTS DOC: HCPCS | Performed by: PHYSICIAN ASSISTANT

## 2024-03-01 RX ORDER — LIDOCAINE HYDROCHLORIDE 10 MG/ML
3 INJECTION, SOLUTION INFILTRATION; PERINEURAL ONCE
Status: COMPLETED | OUTPATIENT
Start: 2024-03-01 | End: 2024-03-01

## 2024-03-01 RX ORDER — BETAMETHASONE SODIUM PHOSPHATE AND BETAMETHASONE ACETATE 3; 3 MG/ML; MG/ML
6 INJECTION, SUSPENSION INTRA-ARTICULAR; INTRALESIONAL; INTRAMUSCULAR; SOFT TISSUE ONCE
Status: COMPLETED | OUTPATIENT
Start: 2024-03-01 | End: 2024-03-01

## 2024-03-01 RX ADMIN — BETAMETHASONE SODIUM PHOSPHATE AND BETAMETHASONE ACETATE 6 MG: 3; 3 INJECTION, SUSPENSION INTRA-ARTICULAR; INTRALESIONAL; INTRAMUSCULAR; SOFT TISSUE at 10:34

## 2024-03-01 RX ADMIN — BETAMETHASONE SODIUM PHOSPHATE AND BETAMETHASONE ACETATE 6 MG: 3; 3 INJECTION, SUSPENSION INTRA-ARTICULAR; INTRALESIONAL; INTRAMUSCULAR; SOFT TISSUE at 10:36

## 2024-03-01 RX ADMIN — LIDOCAINE HYDROCHLORIDE 3 ML: 10 INJECTION, SOLUTION INFILTRATION; PERINEURAL at 10:37

## 2024-03-01 RX ADMIN — LIDOCAINE HYDROCHLORIDE 3 ML: 10 INJECTION, SOLUTION INFILTRATION; PERINEURAL at 10:36

## 2024-03-01 NOTE — PROGRESS NOTES
daily, Disp: , Rfl:     Allergies   Allergen Reactions    Iodine Swelling       Social History     Socioeconomic History    Marital status:      Spouse name: Not on file    Number of children: Not on file    Years of education: Not on file    Highest education level: Not on file   Occupational History    Not on file   Tobacco Use    Smoking status: Never    Smokeless tobacco: Never   Substance and Sexual Activity    Alcohol use: No    Drug use: No    Sexual activity: Not on file   Other Topics Concern    Not on file   Social History Narrative    Not on file     Social Determinants of Health     Financial Resource Strain: Not on file   Food Insecurity: Not on file   Transportation Needs: Not on file   Physical Activity: Not on file   Stress: Not on file   Social Connections: Not on file   Intimate Partner Violence: Not on file   Housing Stability: Not on file       Past Surgical History:   Procedure Laterality Date    FOOT/TOES SURGERY PROC UNLISTED         Patient seen evaluated today for bilateral knee pain.  Her right knee is worse than her left knee.  She does have pain on a daily basis which is affecting her quality of life and actives of daily living.  Injections are only about effective for a month.  The left knee is little bit more responsive to the injections.  She does have night pain.    Patient denies recent fevers, chills, chest pain, SOB, or injuries.   No recent systemic changes noted.  A 12-point review of systems is performed today.  Pertinent positives are noted.  All other systems reviewed and otherwise are negative.    Physical exam: General: Alert and oriented x3, nad.  well-developed, well nourished.  normal affect, AF.  NC/AT, EOMI, neck supple, trachea midline, no JVD present. Breathing is non-labored.  Examination of the lower extremities reveals pain-free range of motion of the hips.  There is some tenderness palpation trochanter bursa on the right side.  On the left.  Negative

## 2024-03-28 ENCOUNTER — TELEPHONE (OUTPATIENT)
Age: 74
End: 2024-03-28

## 2024-03-28 NOTE — TELEPHONE ENCOUNTER
Called and spoke with patient , all questions answered and she is scheduled to start vanessa knee euflexxa 4/19/24

## 2024-03-28 NOTE — TELEPHONE ENCOUNTER
Patient has a concern about the 3 knee injection that she is to have?, due to the one she had on 3/1

## 2024-04-19 ENCOUNTER — OFFICE VISIT (OUTPATIENT)
Age: 74
End: 2024-04-19
Payer: COMMERCIAL

## 2024-04-19 DIAGNOSIS — M17.11 UNILATERAL PRIMARY OSTEOARTHRITIS, RIGHT KNEE: Primary | ICD-10-CM

## 2024-04-19 DIAGNOSIS — M17.12 UNILATERAL PRIMARY OSTEOARTHRITIS, LEFT KNEE: ICD-10-CM

## 2024-04-19 PROCEDURE — 90000 NO LOS: CPT | Performed by: PHYSICIAN ASSISTANT

## 2024-04-19 PROCEDURE — 20611 DRAIN/INJ JOINT/BURSA W/US: CPT | Performed by: PHYSICIAN ASSISTANT

## 2024-04-19 RX ORDER — HYALURONATE SODIUM 10 MG/ML
20 SYRINGE (ML) INTRAARTICULAR ONCE
Status: COMPLETED | OUTPATIENT
Start: 2024-04-19 | End: 2024-04-19

## 2024-04-19 RX ADMIN — Medication 20 MG: at 11:17

## 2024-04-19 RX ADMIN — Medication 20 MG: at 11:16

## 2024-04-19 NOTE — PROGRESS NOTES
Patient: Charlotte Schwarz                MRN: [id number]       SSN: [id number]  YOB: 1950        AGE: 73 y.o.        SEX: female  There is no height or weight on file to calculate BMI.    PCP: Maite Santana MD  04/19/24        Pt presents today for their 1st euflexxa  injection for bilateral knee .  There has been no recent fevers/chills, systemic changes, or injuries to report.    PE:  General A&Ox3, NAD  Exam of the knees reveals skin intact, no erythema, no ecchymosis, no signs for infection.  No cyanosis, clubbing, or edema present distally.  Neg calf tenderness, neg homans, no signs for DVT present.    A: bilateral knee OA    P: The bilateral knee was injected with 2ml euflexxa with US guided assistance without complications.  Patient was instructed on post injection care.     Chart reviewed for the following:  Guerrero QUIÑONES PA-C, have reviewed the History, Physical and updated the Allergic reactions for Charlotte Schwarz?    TIME OUT performed immediately prior to start of procedure:  Guerrero QUIÑONES PA-C, have performed the following reviews on Charlotte Schwarz prior to the start of the procedure:  ????????  * Patient was identified by name and date of birth   * Agreement on procedure being performed was verified  * Risks and Benefits explained to the patient  * Procedure site verified and marked as necessary  * Patient was positioned for comfort  * Consent was signed and verified    Time: 11:16 AM    Body part: bilateral knee, intra-articular    Medication & Dose: 2ml euflexxa each knee    Date of procedure: 04/19/24    Procedure performed by: JR Clyde PA-C    Patient assisted by: self    How tolerated by patient: tolerated the procedure well with no complications    Post Procedural Pain Scale: 7    Comments:  [employer] using a frequency of 10MHz with a 12L-RS transducer head was used to confirm needle placement.  Ultrasound images captured using [employer]

## 2024-04-26 ENCOUNTER — OFFICE VISIT (OUTPATIENT)
Age: 74
End: 2024-04-26
Payer: MEDICARE

## 2024-04-26 DIAGNOSIS — M17.11 PRIMARY OSTEOARTHRITIS OF RIGHT KNEE: ICD-10-CM

## 2024-04-26 DIAGNOSIS — M17.12 PRIMARY OSTEOARTHRITIS OF LEFT KNEE: Primary | ICD-10-CM

## 2024-04-26 PROCEDURE — 20611 DRAIN/INJ JOINT/BURSA W/US: CPT | Performed by: PHYSICIAN ASSISTANT

## 2024-04-26 RX ORDER — HYALURONATE SODIUM 10 MG/ML
20 SYRINGE (ML) INTRAARTICULAR ONCE
Status: COMPLETED | OUTPATIENT
Start: 2024-04-26 | End: 2024-04-26

## 2024-04-26 RX ADMIN — Medication 20 MG: at 11:08

## 2024-04-26 RX ADMIN — Medication 20 MG: at 11:07

## 2024-05-03 ENCOUNTER — OFFICE VISIT (OUTPATIENT)
Age: 74
End: 2024-05-03
Payer: MEDICARE

## 2024-05-03 DIAGNOSIS — M17.12 PRIMARY OSTEOARTHRITIS OF LEFT KNEE: ICD-10-CM

## 2024-05-03 DIAGNOSIS — M17.11 PRIMARY OSTEOARTHRITIS OF RIGHT KNEE: Primary | ICD-10-CM

## 2024-05-03 PROCEDURE — 20611 DRAIN/INJ JOINT/BURSA W/US: CPT | Performed by: PHYSICIAN ASSISTANT

## 2024-05-03 PROCEDURE — 90000 NO LOS: CPT | Performed by: PHYSICIAN ASSISTANT

## 2024-05-03 RX ORDER — HYALURONATE SODIUM 10 MG/ML
20 SYRINGE (ML) INTRAARTICULAR ONCE
Status: COMPLETED | OUTPATIENT
Start: 2024-05-03 | End: 2024-05-03

## 2024-05-03 RX ORDER — IBUPROFEN 800 MG/1
800 TABLET ORAL EVERY 6 HOURS PRN
Qty: 90 TABLET | Refills: 2 | Status: SHIPPED | OUTPATIENT
Start: 2024-05-03

## 2024-05-03 RX ADMIN — Medication 20 MG: at 10:28

## 2024-05-03 RX ADMIN — Medication 20 MG: at 10:29

## 2024-05-03 NOTE — PROGRESS NOTES
Patient: Charlotte Schwarz                MRN: [id number]       SSN: [id number]  YOB: 1950        AGE: 73 y.o.        SEX: female  There is no height or weight on file to calculate BMI.    PCP: Maite Santana MD  05/03/24        Pt presents today for their 3rd euflexxa  injection for bilateral knee .  There has been no recent fevers/chills, systemic changes, or injuries to report.    PE:  General A&Ox3, NAD  Exam of the knees reveals skin intact, no erythema, no ecchymosis, no signs for infection.  No cyanosis, clubbing, or edema present distally.  Neg calf tenderness, neg homans, no signs for DVT present.    A: bilateral knee OA    P: The bilateral knee was injected with 2ml euflexxa with US guided assistance without complications.  Patient was instructed on post injection care.     Chart reviewed for the following:  Guerrero QUIÑONES PA-C, have reviewed the History, Physical and updated the Allergic reactions for Charlotte Schwarz?    TIME OUT performed immediately prior to start of procedure:  Guerrero QUIÑONES PA-C, have performed the following reviews on Charlotte Schwarz prior to the start of the procedure:  ????????  * Patient was identified by name and date of birth   * Agreement on procedure being performed was verified  * Risks and Benefits explained to the patient  * Procedure site verified and marked as necessary  * Patient was positioned for comfort  * Consent was signed and verified    Time: 10:26 AM    Body part: bilateral knee, intra-articular    Medication & Dose: 2ml euflexxa each knee    Date of procedure: 05/03/24    Procedure performed by: JR Clyde PA-C    Patient assisted by: self    How tolerated by patient: tolerated the procedure well with no complications    Post Procedural Pain Scale: 6    Comments:  [employer] using a frequency of 10MHz with a 12L-RS transducer head was used to confirm needle placement.  Ultrasound images captured using [employer] 
unable to determine

## 2024-05-07 ENCOUNTER — TELEPHONE (OUTPATIENT)
Age: 74
End: 2024-05-07

## 2024-05-07 NOTE — TELEPHONE ENCOUNTER
Patient states she is having an issues getting her rx for ibuprofen filled, and that she was advised to contact the office back. Patient also states she was told her pharmacy tried reaching out to our office, but haven't heard back from anyone. Patient is asking if someone from our office can call her pharmacy to see what's needed so she is able to get her rx. Patient uses the [facility], and she can be reached at [phone].

## 2024-05-08 RX ORDER — IBUPROFEN 800 MG/1
800 TABLET ORAL EVERY 8 HOURS PRN
Qty: 120 TABLET | Refills: 3 | Status: SHIPPED | OUTPATIENT
Start: 2024-05-08

## 2024-05-10 NOTE — TELEPHONE ENCOUNTER
Patient was asked to call us by [facility] pharmacy to clarify the dosing instructions for Ibuprofen, is it 3 x daily or 1 every 8 hrs as needed?  Please contact pharmacy to confirm at [phone].  Patient also requests a call to advise once done at [phone].

## 2024-05-10 NOTE — TELEPHONE ENCOUNTER
Spoke w/ pharmacy and confirmed correct instructions. And spoke w/ pt and confirmed that message was received and spoke with pharmacy

## 2024-06-28 ENCOUNTER — OFFICE VISIT (OUTPATIENT)
Age: 74
End: 2024-06-28

## 2024-06-28 DIAGNOSIS — M17.12 UNILATERAL PRIMARY OSTEOARTHRITIS, LEFT KNEE: ICD-10-CM

## 2024-06-28 DIAGNOSIS — M17.11 UNILATERAL PRIMARY OSTEOARTHRITIS, RIGHT KNEE: Primary | ICD-10-CM

## 2024-06-28 RX ORDER — LIDOCAINE HYDROCHLORIDE 10 MG/ML
3 INJECTION, SOLUTION INFILTRATION; PERINEURAL ONCE
Status: COMPLETED | OUTPATIENT
Start: 2024-06-28 | End: 2024-06-28

## 2024-06-28 RX ORDER — BETAMETHASONE SODIUM PHOSPHATE AND BETAMETHASONE ACETATE 3; 3 MG/ML; MG/ML
6 INJECTION, SUSPENSION INTRA-ARTICULAR; INTRALESIONAL; INTRAMUSCULAR; SOFT TISSUE ONCE
Status: COMPLETED | OUTPATIENT
Start: 2024-06-28 | End: 2024-06-28

## 2024-06-28 RX ADMIN — BETAMETHASONE SODIUM PHOSPHATE AND BETAMETHASONE ACETATE 6 MG: 3; 3 INJECTION, SUSPENSION INTRA-ARTICULAR; INTRALESIONAL; INTRAMUSCULAR; SOFT TISSUE at 10:01

## 2024-06-28 RX ADMIN — LIDOCAINE HYDROCHLORIDE 3 ML: 10 INJECTION, SOLUTION INFILTRATION; PERINEURAL at 10:01

## 2024-06-28 NOTE — PROGRESS NOTES
Patient: Charlotte Schwarz                MRN: [id number]       SSN: [id number]  YOB: 1950        AGE: 73 y.o.        SEX: female  There is no height or weight on file to calculate BMI.    PCP: Maite Santana MD  06/28/24            REVIEW OF SYSTEMS:  Constitutional: Negative for fever, chills, weight loss and malaise/fatigue.   HENT: Negative.    Eyes: Negative.    Respiratory: Negative.   Cardiovascular: Negative.   Gastrointestinal: No bowel incontinence or constipation.  Genitourinary: No bladder incontinence or saddle anesthesia.  Skin: Negative.   Neurological: Negative.    Endo/Heme/Allergies: Negative.    Psychiatric/Behavioral: Negative.  Musculoskeletal: As per HPI above.     Past Medical History:   Diagnosis Date    Arthritis     Hypertension          Current Outpatient Medications:     ibuprofen (ADVIL;MOTRIN) 800 MG tablet, Take 1 tablet by mouth every 8 hours as needed for Pain, Disp: 120 tablet, Rfl: 3    ibuprofen (ADVIL;MOTRIN) 800 MG tablet, Take 1 tablet by mouth every 6 hours as needed for Pain, Disp: 90 tablet, Rfl: 2    ibuprofen (ADVIL;MOTRIN) 800 MG tablet, TAKE 1 TABLET BY MOUTH 3 TIMES  DAILY AS NEEDED FOR PAIN, Disp: 270 tablet, Rfl: 0    meloxicam (MOBIC) 15 MG tablet, Take 1 tablet by mouth daily, Disp: 90 tablet, Rfl: 1    meloxicam (MOBIC) 15 MG tablet, TAKE 1 TABLET BY MOUTH DAILY, Disp: 90 tablet, Rfl: 3    diclofenac sodium (VOLTAREN) 1 % GEL, Apply 4 g topically 4 times daily 5 100 gram tubes with 5 refills, Disp: 100 g, Rfl: 5    diclofenac (FLECTOR) 1.3 % PTCH patch, Place 1 patch onto the skin 2 times daily, Disp: 60 patch, Rfl: 2    amLODIPine (NORVASC) 5 MG tablet, Take 1 tablet by mouth daily, Disp: , Rfl:     besifloxacin (BESIVANCE) 0.6 % SUSP, 1 drop 3 times daily, Disp: , Rfl:     augmented betamethasone dipropionate (DIPROLENE-AF) 0.05 % ointment, Apply topically 2 times daily, Disp: , Rfl:     latanoprost (XALATAN) 0.005 % ophthalmic

## 2024-07-16 DIAGNOSIS — Z01.818 PRE-OP TESTING: Primary | ICD-10-CM

## 2024-07-16 DIAGNOSIS — M17.11 PRIMARY OSTEOARTHRITIS OF RIGHT KNEE: ICD-10-CM

## 2024-07-30 ENCOUNTER — PREP FOR PROCEDURE (OUTPATIENT)
Age: 74
End: 2024-07-30

## 2024-07-30 PROBLEM — M17.11 OSTEOARTHRITIS OF RIGHT KNEE: Status: ACTIVE | Noted: 2024-07-30

## 2024-08-05 ENCOUNTER — HOSPITAL ENCOUNTER (OUTPATIENT)
Facility: HOSPITAL | Age: 74
Discharge: HOME | End: 2024-08-08
Payer: MEDICARE

## 2024-08-05 ENCOUNTER — HOSPITAL ENCOUNTER (OUTPATIENT)
Facility: HOSPITAL | Age: 74
End: 2024-08-05
Payer: MEDICARE

## 2024-08-05 ENCOUNTER — NURSE ONLY (OUTPATIENT)
Age: 74
End: 2024-08-05
Payer: MEDICARE

## 2024-08-05 DIAGNOSIS — M17.11 PRIMARY OSTEOARTHRITIS OF RIGHT KNEE: ICD-10-CM

## 2024-08-05 DIAGNOSIS — Z01.818 PRE-OP TESTING: ICD-10-CM

## 2024-08-05 DIAGNOSIS — M17.11 OSTEOARTHRITIS OF RIGHT KNEE, UNSPECIFIED OSTEOARTHRITIS TYPE: ICD-10-CM

## 2024-08-05 DIAGNOSIS — M25.561 RIGHT KNEE PAIN, UNSPECIFIED CHRONICITY: ICD-10-CM

## 2024-08-05 DIAGNOSIS — M17.11 UNILATERAL PRIMARY OSTEOARTHRITIS, RIGHT KNEE: Primary | ICD-10-CM

## 2024-08-05 LAB
ALBUMIN SERPL-MCNC: 3.9 G/DL (ref 3.4–5)
ANION GAP SERPL CALC-SCNC: 7 MMOL/L (ref 3–18)
APPEARANCE UR: CLEAR
APTT PPP: 27.6 SEC (ref 23–36.4)
BACTERIA URNS QL MICRO: ABNORMAL /HPF
BASOPHILS # BLD: 0 K/UL (ref 0–0.1)
BASOPHILS NFR BLD: 0 % (ref 0–2)
BILIRUB UR QL: NEGATIVE
BUN SERPL-MCNC: 15 MG/DL (ref 7–18)
BUN/CREAT SERPL: 15 (ref 12–20)
CALCIUM SERPL-MCNC: 9.6 MG/DL (ref 8.5–10.1)
CHLORIDE SERPL-SCNC: 97 MMOL/L (ref 100–111)
CO2 SERPL-SCNC: 36 MMOL/L (ref 21–32)
COLOR UR: YELLOW
CREAT SERPL-MCNC: 1.02 MG/DL (ref 0.6–1.3)
DIFFERENTIAL METHOD BLD: NORMAL
EOSINOPHIL # BLD: 0.1 K/UL (ref 0–0.4)
EOSINOPHIL NFR BLD: 2 % (ref 0–5)
EPITH CASTS URNS QL MICRO: ABNORMAL /LPF (ref 0–5)
ERYTHROCYTE [DISTWIDTH] IN BLOOD BY AUTOMATED COUNT: 13 % (ref 11.6–14.5)
EST. AVERAGE GLUCOSE BLD GHB EST-MCNC: 123 MG/DL
GLUCOSE SERPL-MCNC: 101 MG/DL (ref 74–99)
GLUCOSE UR STRIP.AUTO-MCNC: NEGATIVE MG/DL
HBA1C MFR BLD: 5.9 % (ref 4.2–5.6)
HCT VFR BLD AUTO: 37.6 % (ref 35–45)
HGB BLD-MCNC: 12.2 G/DL (ref 12–16)
HGB UR QL STRIP: NEGATIVE
IMM GRANULOCYTES # BLD AUTO: 0 K/UL (ref 0–0.04)
IMM GRANULOCYTES NFR BLD AUTO: 0 % (ref 0–0.5)
INR PPP: 0.9 (ref 0.9–1.1)
KETONES UR QL STRIP.AUTO: NEGATIVE MG/DL
LEUKOCYTE ESTERASE UR QL STRIP.AUTO: NEGATIVE
LYMPHOCYTES # BLD: 2.3 K/UL (ref 0.9–3.6)
LYMPHOCYTES NFR BLD: 34 % (ref 21–52)
MCH RBC QN AUTO: 29 PG (ref 24–34)
MCHC RBC AUTO-ENTMCNC: 32.4 G/DL (ref 31–37)
MCV RBC AUTO: 89.3 FL (ref 78–100)
MONOCYTES # BLD: 0.6 K/UL (ref 0.05–1.2)
MONOCYTES NFR BLD: 9 % (ref 3–10)
NEUTS SEG # BLD: 3.6 K/UL (ref 1.8–8)
NEUTS SEG NFR BLD: 54 % (ref 40–73)
NITRITE UR QL STRIP.AUTO: NEGATIVE
NRBC # BLD: 0 K/UL (ref 0–0.01)
NRBC BLD-RTO: 0 PER 100 WBC
PH UR STRIP: 6 (ref 5–8)
PLATELET # BLD AUTO: 280 K/UL (ref 135–420)
PMV BLD AUTO: 11.1 FL (ref 9.2–11.8)
POTASSIUM SERPL-SCNC: 3.4 MMOL/L (ref 3.5–5.5)
PROT UR STRIP-MCNC: NEGATIVE MG/DL
PROTHROMBIN TIME: 12.8 SEC (ref 11.9–14.9)
RBC # BLD AUTO: 4.21 M/UL (ref 4.2–5.3)
RBC #/AREA URNS HPF: ABNORMAL /HPF (ref 0–5)
SODIUM SERPL-SCNC: 140 MMOL/L (ref 136–145)
SP GR UR REFRACTOMETRY: 1.01 (ref 1–1.03)
UROBILINOGEN UR QL STRIP.AUTO: 0.2 EU/DL (ref 0.2–1)
WBC # BLD AUTO: 6.7 K/UL (ref 4.6–13.2)
WBC URNS QL MICRO: ABNORMAL /HPF (ref 0–4)

## 2024-08-05 PROCEDURE — 93010 ELECTROCARDIOGRAM REPORT: CPT | Performed by: INTERNAL MEDICINE

## 2024-08-05 PROCEDURE — 80048 BASIC METABOLIC PNL TOTAL CA: CPT

## 2024-08-05 PROCEDURE — 83036 HEMOGLOBIN GLYCOSYLATED A1C: CPT

## 2024-08-05 PROCEDURE — 73564 X-RAY EXAM KNEE 4 OR MORE: CPT | Performed by: PHYSICIAN ASSISTANT

## 2024-08-05 PROCEDURE — 85610 PROTHROMBIN TIME: CPT

## 2024-08-05 PROCEDURE — 36415 COLL VENOUS BLD VENIPUNCTURE: CPT

## 2024-08-05 PROCEDURE — 85025 COMPLETE CBC W/AUTO DIFF WBC: CPT

## 2024-08-05 PROCEDURE — 71046 X-RAY EXAM CHEST 2 VIEWS: CPT

## 2024-08-05 PROCEDURE — 93005 ELECTROCARDIOGRAM TRACING: CPT

## 2024-08-05 PROCEDURE — 81001 URINALYSIS AUTO W/SCOPE: CPT

## 2024-08-05 PROCEDURE — 85730 THROMBOPLASTIN TIME PARTIAL: CPT

## 2024-08-05 PROCEDURE — 82040 ASSAY OF SERUM ALBUMIN: CPT

## 2024-08-05 PROCEDURE — 87086 URINE CULTURE/COLONY COUNT: CPT

## 2024-08-06 LAB
BACTERIA SPEC CULT: NORMAL
EKG ATRIAL RATE: 63 BPM
EKG DIAGNOSIS: NORMAL
EKG P AXIS: 52 DEGREES
EKG P-R INTERVAL: 192 MS
EKG Q-T INTERVAL: 416 MS
EKG QRS DURATION: 78 MS
EKG QTC CALCULATION (BAZETT): 425 MS
EKG R AXIS: 33 DEGREES
EKG T AXIS: 23 DEGREES
EKG VENTRICULAR RATE: 63 BPM
SERVICE CMNT-IMP: NORMAL

## 2024-08-07 ENCOUNTER — OFFICE VISIT (OUTPATIENT)
Age: 74
End: 2024-08-07

## 2024-08-07 VITALS
WEIGHT: 127.4 LBS | SYSTOLIC BLOOD PRESSURE: 129 MMHG | DIASTOLIC BLOOD PRESSURE: 76 MMHG | HEIGHT: 59 IN | BODY MASS INDEX: 25.68 KG/M2

## 2024-08-07 DIAGNOSIS — M17.11 PRIMARY OSTEOARTHRITIS OF RIGHT KNEE: Primary | ICD-10-CM

## 2024-08-07 RX ORDER — PREGABALIN 25 MG/1
75 CAPSULE ORAL
OUTPATIENT
Start: 2024-08-07 | End: 2024-08-07

## 2024-08-07 RX ORDER — DEXAMETHASONE SODIUM PHOSPHATE 4 MG/ML
8 INJECTION, SOLUTION INTRA-ARTICULAR; INTRALESIONAL; INTRAMUSCULAR; INTRAVENOUS; SOFT TISSUE
OUTPATIENT
Start: 2024-08-07 | End: 2024-08-07

## 2024-08-07 RX ORDER — ACETAMINOPHEN 325 MG/1
1000 TABLET ORAL
OUTPATIENT
Start: 2024-08-07 | End: 2024-08-07

## 2024-08-07 RX ORDER — CELECOXIB 100 MG/1
200 CAPSULE ORAL
OUTPATIENT
Start: 2024-08-07 | End: 2024-08-07

## 2024-08-07 RX ORDER — HYDROCHLOROTHIAZIDE 12.5 MG/1
12.5 TABLET ORAL EVERY MORNING
COMMUNITY

## 2024-08-07 RX ORDER — TAMSULOSIN HYDROCHLORIDE 0.4 MG/1
0.4 CAPSULE ORAL
OUTPATIENT
Start: 2024-08-07 | End: 2024-08-07

## 2024-08-07 ASSESSMENT — PROMIS GLOBAL HEALTH SCALE
IN THE PAST 7 DAYS, HOW WOULD YOU RATE YOUR PAIN ON AVERAGE [ON A SCALE FROM 0 (NO PAIN) TO 10 (WORST IMAGINABLE PAIN)]?: 7
IN GENERAL, WOULD YOU SAY YOUR HEALTH IS...[ON A SCALE OF 1 (POOR) TO 5 (EXCELLENT)]: GOOD
IN THE PAST 7 DAYS, HOW OFTEN HAVE YOU BEEN BOTHERED BY EMOTIONAL PROBLEMS, SUCH AS FEELING ANXIOUS, DEPRESSED, OR IRRITABLE [ON A SCALE FROM 1 (NEVER) TO 5 (ALWAYS)]?: SOMETIMES
IN GENERAL, HOW WOULD YOU RATE YOUR PHYSICAL HEALTH [ON A SCALE OF 1 (POOR) TO 5 (EXCELLENT)]?: GOOD
SUM OF RESPONSES TO QUESTIONS 2, 4, 5, & 10: 13
IN GENERAL, HOW WOULD YOU RATE YOUR SATISFACTION WITH YOUR SOCIAL ACTIVITIES AND RELATIONSHIPS [ON A SCALE OF 1 (POOR) TO 5 (EXCELLENT)]?: GOOD
WHO IS THE PERSON COMPLETING THE PROMIS V1.1 SURVEY?: SELF
SUM OF RESPONSES TO QUESTIONS 3, 6, 7, & 8: 18
IN GENERAL, PLEASE RATE HOW WELL YOU CARRY OUT YOUR USUAL SOCIAL ACTIVITIES (INCLUDES ACTIVITIES AT HOME, AT WORK, AND IN YOUR COMMUNITY, AND RESPONSIBILITIES AS A PARENT, CHILD, SPOUSE, EMPLOYEE, FRIEND, ETC) [ON A SCALE OF 1 (POOR) TO 5 (EXCELLENT)]?: VERY GOOD
TO WHAT EXTENT ARE YOU ABLE TO CARRY OUT YOUR EVERYDAY PHYSICAL ACTIVITIES SUCH AS WALKING, CLIMBING STAIRS, CARRYING GROCERIES, OR MOVING A CHAIR [ON A SCALE OF 1 (NOT AT ALL) TO 5 (COMPLETELY)]?: MOSTLY
IN GENERAL, HOW WOULD YOU RATE YOUR MENTAL HEALTH, INCLUDING YOUR MOOD AND YOUR ABILITY TO THINK [ON A SCALE OF 1 (POOR) TO 5 (EXCELLENT)]?: VERY GOOD
HOW IS THE PROMIS V1.1 BEING ADMINISTERED?: TELEPHONE
IN THE PAST 7 DAYS, HOW WOULD YOU RATE YOUR FATIGUE ON AVERAGE [ON A SCALE FROM 1 (NONE) TO 5 (VERY SEVERE)]?: MILD
IN GENERAL, WOULD YOU SAY YOUR QUALITY OF LIFE IS...[ON A SCALE OF 1 (POOR) TO 5 (EXCELLENT)]: GOOD

## 2024-08-07 ASSESSMENT — KOOS JR
STRAIGHTENING KNEE FULLY: EXTREME
BENDING TO THE FLOOR TO PICK UP OBJECT: EXTREME
HOW SEVERE IS YOUR KNEE STIFFNESS AFTER FIRST WAKING IN MORNING: SEVERE
KOOS JR TOTAL INTERVAL SCORE: 28.251
STANDING UPRIGHT: MODERATE
GOING UP OR DOWN STAIRS: EXTREME
TWISING OR PIVOTING ON KNEE: SEVERE
RISING FROM SITTING: SEVERE

## 2024-08-07 NOTE — PATIENT INSTRUCTIONS
Patient: Charlotte Schwarz                MRN: [id number]       SSN:   YOB: 1950        AGE: 73 y.o.        SEX: female  Body mass index is 25.73 kg/m².    08/07/24    DO:  1:  Sit with the leg out straight 5-10 min every hour while awake.  Keep the toes pointed       up towards the pb.         2.  Bend your knee 5-10 min every hour.  Bend it to the point of pain and hold it for 5-10       Min.        3.  ICE your knee 20 min every hour    4.  You can expect to have swelling and discomfort in your thigh down to your knee.  Swelling may extend to your foot.  You may have bruising from the thigh to the foot as well.  Some numbness can be expected to the outside part of the knee.  Wear the compression stockings and elevate your leg.      DO NOT:    1.  Do not place anything under your knee to prop it up  2.  Do not sit in the recliner chair.

## 2024-08-07 NOTE — H&P
(Site: Right Upper Arm, Position: Sitting, Cuff Size: Medium Adult)   Ht 1.499 m (4' 11\")   Wt 57.8 kg (127 lb 6.4 oz)   BMI 25.73 kg/m²   A&O X3, NAD, well develop, well nourished  Heart: S1-S2, rrr  Lungs: CTA bilat  Abd: soft, nt, nt, + bs in all quadrants  Ext:  Pos distal pulses to DP, PT      X-ray: Radiographs of the right knee done 8/5/2024 at the [facility] location including AP, lateral, skyline, three-foot standing views confirms end-stage arthritis of bone to bone eburnation or malalignment.    Labs: labs were reviewed and wnl.  Ua neg    A:  right  knee end stage OA    P:  At this point we will move forward with surgery.  Again, the alternatives, risks, complications, as well as expected outcome were discussed and the patient wishes to proceed with surgery.  Pt has been instructed to stop aspirin, nsaids, rheumatologic medications and blood thinners.  They have also been instructed to continue on any heart and bp meds and to take them the morning of surgery with sips of water.     The patient was counseled at length about the risks of lea Covid-19 during their perioperative period and any recovery window from their procedure.  The patient was made aware that lea Covid-19  may worsen their prognosis for recovering from their procedure  and lend to a higher morbidity and/or mortality risk.  All material risks, benefits, and reasonable alternatives including postponing the procedure were discussed. The patient does wish to proceed with the procedure at this time.                JR Clyde Boles

## 2024-08-07 NOTE — PROGRESS NOTES
Instructions for your surgery at [facility]      Today's Date:  8/7/2024      Patient's Name:  Charlotte Schwarz           Surgery Date:  08/19/2024              Please enter the main entrance of the hospital and check-in at the front security desk located in the lobby. They will direct you to the area to report for your surgery.     Do NOT eat or drink anything, including candy, gum, or ice chips after midnight prior to your surgery, unless you have specific instructions from your surgeon or anesthesia provider to do so.  Brush your teeth before coming to the hospital. You may swish with water, but do not swallow.  No smoking/Vaping/E-Cigarettes 24 hours prior to the day of surgery.  No alcohol 24 hours prior to the day of surgery.  No recreational drugs for one week prior to the day of surgery.  Bring Photo ID, Insurance information, and Co-pay if required on day of surgery.  Bring in pertinent legal documents, such as, Medical Power of , DNR, Advance Directive, etc.  Leave all valuables, including money/purse, at home.  Remove all jewelry, including ALL body piercings, nail polish, acrylic nails, and makeup (including mascara); no lotions, powders, deodorant, or perfume/cologne/after shave on the skin.  Follow instruction for Hibiclens washes and CHG wipes from surgeon's office.   Glasses and dentures may be worn to the hospital. They must be removed prior to surgery. Please bring case/container for glasses or dentures.   Contact lenses should not be worn on day of surgery.   Call your doctor's office if symptoms of a cold or illness develop within 24-48 hours prior to your surgery.  Call your doctor's office if you have any questions concerning insurance or co-pays.  15. AN ADULT (relative or friend 18 years or older) MUST DRIVE YOU HOME AFTER YOUR SURGERY.  16. Please make arrangements for a responsible adult (18 years or older) to be with you for 24 hours after your surgery.

## 2024-08-07 NOTE — H&P (VIEW-ONLY)
HISTORY & PHYSICAL      Patient: Charlotte Schwarz                MRN: [id number]       SSN: [id number]  YOB: 1950        AGE: 73 y.o.        SEX: female  Body mass index is 25.73 kg/m².    PCP: Maite Santana MD  08/07/24      CC: right knee end stage OA  Problem List Items Addressed This Visit          Other    Osteoarthritis of right knee - Primary         HPI:  The patient is a pleasant 73 y.o. whom has end stage OA of their right knee and has failed conservative treatment including but not limited to NSAIDS, cortisone injections, viscosupplementation, PT, and pain medicine.  Due to the current findings and affected activities of daily living, surgical intervention is indicated.  The alternatives, risks, complications, as well as expected outcome were discussed.  These include but are not limited to infection, blood loss, need for blood transfusion, neurovascular damage, DVT, PE,  post-op stiffness and pain, leg length discrepancy, dislocation, anesthetic complications, prothesis longevity, need for more surgery, MI, stroke, and even death.  The patient understands and wishes to proceed with surgery.      Past Medical History:   Diagnosis Date    Arthritis     Hypertension          Current Outpatient Medications:     ibuprofen (ADVIL;MOTRIN) 800 MG tablet, Take 1 tablet by mouth every 8 hours as needed for Pain, Disp: 120 tablet, Rfl: 3    ibuprofen (ADVIL;MOTRIN) 800 MG tablet, Take 1 tablet by mouth every 6 hours as needed for Pain, Disp: 90 tablet, Rfl: 2    ibuprofen (ADVIL;MOTRIN) 800 MG tablet, TAKE 1 TABLET BY MOUTH 3 TIMES  DAILY AS NEEDED FOR PAIN, Disp: 270 tablet, Rfl: 0    meloxicam (MOBIC) 15 MG tablet, Take 1 tablet by mouth daily, Disp: 90 tablet, Rfl: 1    meloxicam (MOBIC) 15 MG tablet, TAKE 1 TABLET BY MOUTH DAILY, Disp: 90 tablet, Rfl: 3    diclofenac sodium (VOLTAREN) 1 % GEL, Apply 4 g topically 4 times daily 5 100 gram tubes with 5 refills, Disp: 100 g, Rfl:

## 2024-08-12 ENCOUNTER — TELEPHONE (OUTPATIENT)
Age: 74
End: 2024-08-12

## 2024-08-12 NOTE — TELEPHONE ENCOUNTER
Patient is scheduled for Rt TKA on 8/19/24 and states when she came in for her H & P she forgot to let us know she uses Veterinary Liniment Gel, Topical Analgesic Rub For sore muscles and joint pain daily. She would like a call back letting her know if she needs to stop using it prior to SX.    She may be reached at [phone]

## 2024-08-13 ENCOUNTER — OFFICE VISIT (OUTPATIENT)
Age: 74
End: 2024-08-13
Payer: MEDICARE

## 2024-08-13 VITALS
BODY MASS INDEX: 26 KG/M2 | HEIGHT: 59 IN | DIASTOLIC BLOOD PRESSURE: 82 MMHG | WEIGHT: 129 LBS | OXYGEN SATURATION: 99 % | SYSTOLIC BLOOD PRESSURE: 124 MMHG | HEART RATE: 64 BPM

## 2024-08-13 DIAGNOSIS — E78.00 HYPERCHOLESTEROLEMIA: ICD-10-CM

## 2024-08-13 DIAGNOSIS — R94.31 ABNORMAL EKG: Primary | ICD-10-CM

## 2024-08-13 DIAGNOSIS — Z01.818 PRE-OPERATIVE CLEARANCE: ICD-10-CM

## 2024-08-13 DIAGNOSIS — I10 HYPERTENSION, UNSPECIFIED TYPE: ICD-10-CM

## 2024-08-13 PROCEDURE — 93000 ELECTROCARDIOGRAM COMPLETE: CPT | Performed by: INTERNAL MEDICINE

## 2024-08-13 PROCEDURE — 1123F ACP DISCUSS/DSCN MKR DOCD: CPT | Performed by: INTERNAL MEDICINE

## 2024-08-13 PROCEDURE — 3074F SYST BP LT 130 MM HG: CPT | Performed by: INTERNAL MEDICINE

## 2024-08-13 PROCEDURE — 99202 OFFICE O/P NEW SF 15 MIN: CPT | Performed by: INTERNAL MEDICINE

## 2024-08-13 PROCEDURE — 3079F DIAST BP 80-89 MM HG: CPT | Performed by: INTERNAL MEDICINE

## 2024-08-13 ASSESSMENT — ENCOUNTER SYMPTOMS
ABDOMINAL PAIN: 0
SHORTNESS OF BREATH: 0
VOICE CHANGE: 0
TROUBLE SWALLOWING: 0
BACK PAIN: 0
CONSTIPATION: 0
NAUSEA: 0
BLOOD IN STOOL: 0
DIARRHEA: 0
VOMITING: 0
COUGH: 0
WHEEZING: 0

## 2024-08-13 ASSESSMENT — PATIENT HEALTH QUESTIONNAIRE - PHQ9
SUM OF ALL RESPONSES TO PHQ9 QUESTIONS 1 & 2: 0
SUM OF ALL RESPONSES TO PHQ QUESTIONS 1-9: 0
2. FEELING DOWN, DEPRESSED OR HOPELESS: NOT AT ALL
SUM OF ALL RESPONSES TO PHQ QUESTIONS 1-9: 0
1. LITTLE INTEREST OR PLEASURE IN DOING THINGS: NOT AT ALL

## 2024-08-13 NOTE — PROGRESS NOTES
Charlotte Schwarz presents today for   Chief Complaint   Patient presents with    New Patient     Est care referred by  due to pre op clearance needed     Cardiac Clearance     Rt total knee with Dr. MALINDA Boles 8/19/224 at [facility]     Edema     Bilateral ankle edema on/off       Charlotte Schwarz preferred language for health care discussion is english/other.    Is someone accompanying this pt? yes    Is the patient using any DME equipment during OV? NO    Depression Screening:  Depression: Not at risk (8/13/2024)    PHQ-2     PHQ-2 Score: 0        Learning Assessment:  Who is the primary learner? Patient    What is the preferred language for health care of the primary learner? ENGLISH    How does the primary learner prefer to learn new concepts? DEMONSTRATION    Answered By PATIENT    Relationship to Learner SELF           Pt currently taking Anticoagulant therapy? NO    Pt currently taking Antiplatelet therapy ? NO      Coordination of Care:  1. Have you been to the ER, urgent care clinic since your last visit? Hospitalized since your last visit? NO    2. Have you seen or consulted any other health care providers outside of the [facility] since your last visit? Include any pap smears or colon screening. NO

## 2024-08-13 NOTE — PROGRESS NOTES
08/13/24     Chief Complaint   Patient presents with    New Patient     Est care referred by  due to pre op clearance needed     Cardiac Clearance     Rt total knee with Dr. MALINDA Boles 8/19/224 at [facility]     Edema     Bilateral ankle edema on/off       HPI:   Charlotte Schwarz  is a 73 y.o.  female with history of hypertension, hypercholesterolemia, and osteoarthritis issues of the knees for which she is having a right total knee by Dr. Jon Boles on August 19, 2024.  She comes in today and relates that she has been doing very well from a cardiovascular vantage.  She denies any problems with exertional chest pain, exertional dyspnea, fatigue, palpitations, peripheral edema, or any other cardiovascular symptomatology.    Past Medical History:   Diagnosis Date    Arthritis     Hypertension      Current Outpatient Medications   Medication Sig Dispense Refill    hydroCHLOROthiazide 12.5 MG tablet Take 1 tablet by mouth every morning      ibuprofen (ADVIL;MOTRIN) 800 MG tablet Take 1 tablet by mouth every 8 hours as needed for Pain 120 tablet 3    diclofenac sodium (VOLTAREN) 1 % GEL Apply 4 g topically 4 times daily 5 100 gram tubes with 5 refills (Patient taking differently: Apply 4 g topically 4 times daily as needed for Pain 5 100 gram tubes with 5 refills) 100 g 5    amLODIPine (NORVASC) 5 MG tablet Take 1 tablet by mouth daily      besifloxacin (BESIVANCE) 0.6 % SUSP Place 1 drop into both eyes 3 times daily      latanoprost (XALATAN) 0.005 % ophthalmic solution Place 1 drop into both eyes daily      lovastatin (MEVACOR) 40 MG tablet Take 1 tablet by mouth nightly      Timolol (TIMOPTIC) 0.5 % SOLN ophthalmic solution Place 1 drop into both eyes daily       No current facility-administered medications for this visit.     Allergies   Allergen Reactions    Iodine Swelling     Social History     Tobacco Use    Smoking status: Never    Smokeless tobacco: Never   Vaping Use    Vaping status: Never Used

## 2024-08-15 RX ORDER — POTASSIUM CHLORIDE 1500 MG/1
1 TABLET, EXTENDED RELEASE ORAL DAILY
COMMUNITY
Start: 2024-06-05

## 2024-08-15 RX ORDER — HYDROQUINONE 40 MG/G
CREAM TOPICAL AS NEEDED
COMMUNITY

## 2024-08-16 ENCOUNTER — TELEPHONE (OUTPATIENT)
Facility: HOSPITAL | Age: 74
End: 2024-08-16

## 2024-08-16 NOTE — TELEPHONE ENCOUNTER
Call placed to patient, ID verified x 2. Patient  has decided with their surgeon to have a total knee  replacement to decrease  pain and improve mobility . Topics discussed included surgery preparation, what to expect the day of surgery, medications, physical and occupational therapy, and discharge planning.  It was discussed that this is considered an elective surgery and that prior to the surgery  decisions such as arranging for help at home once they are discharged needs to be made.Patient agreed to get home ready for surgery and to have a ride arranged to go home. She identifies her  as her support system, a walker will be provided to her by the coordinator and she would like to discharge home day of surgery. She lives in a room over the [facility] but she states that she is staying on a lower level area that does not require any steps. She would like her postoperative medications to go to the [facility] outpatient pharmacy.  Instructions were given for CHG bathing. Patient will complete the procedure the morning of surgery. She denies any rashes, bruises or open areas to her skin at this time.   Patient was reminded not to apply any deoderant, perfumes, makeup or lotions to skin the morning of surgery. She will remain NPO after midnight and  will take only the medications as instructed to take by her surgeon the morning of surgery with a sip of water. Patient states that she has stopped all blood thinning medications and will only take her blood pressure and heart medications with a sip of water as instructed the morning of surgery with a sip of water. She will also use her eye drops as prescribed. A total knee replacement  education book will be provided to patient post op prior to discharge. Education regarding the importance of early and frequent ambulation to avoid surgical complications and to assist with pain was provided. Recommended the use of ice to assist with pain and swelling post op for 20 minutes

## 2024-08-19 ENCOUNTER — APPOINTMENT (OUTPATIENT)
Facility: HOSPITAL | Age: 74
End: 2024-08-19
Attending: ORTHOPAEDIC SURGERY
Payer: MEDICARE

## 2024-08-19 ENCOUNTER — ANESTHESIA (OUTPATIENT)
Facility: HOSPITAL | Age: 74
End: 2024-08-19
Payer: MEDICARE

## 2024-08-19 ENCOUNTER — ANESTHESIA EVENT (OUTPATIENT)
Facility: HOSPITAL | Age: 74
End: 2024-08-19
Payer: MEDICARE

## 2024-08-19 ENCOUNTER — HOSPITAL ENCOUNTER (OUTPATIENT)
Facility: HOSPITAL | Age: 74
Setting detail: OBSERVATION
Discharge: HOME | End: 2024-08-19
Attending: ORTHOPAEDIC SURGERY | Admitting: ORTHOPAEDIC SURGERY
Payer: MEDICARE

## 2024-08-19 ENCOUNTER — HOME HEALTH ADMISSION (OUTPATIENT)
Age: 74
End: 2024-08-19
Payer: MEDICARE

## 2024-08-19 VITALS
HEIGHT: 59 IN | OXYGEN SATURATION: 94 % | RESPIRATION RATE: 16 BRPM | WEIGHT: 125 LBS | SYSTOLIC BLOOD PRESSURE: 111 MMHG | DIASTOLIC BLOOD PRESSURE: 60 MMHG | HEART RATE: 81 BPM | TEMPERATURE: 98.2 F | BODY MASS INDEX: 25.2 KG/M2

## 2024-08-19 DIAGNOSIS — S82.001A CLOSED DISPLACED FRACTURE OF RIGHT PATELLA, UNSPECIFIED FRACTURE MORPHOLOGY, INITIAL ENCOUNTER: ICD-10-CM

## 2024-08-19 DIAGNOSIS — M17.10 ARTHRITIS OF KNEE: Primary | ICD-10-CM

## 2024-08-19 PROCEDURE — 6360000002 HC RX W HCPCS: Performed by: PHYSICIAN ASSISTANT

## 2024-08-19 PROCEDURE — 3600000003 HC SURGERY LEVEL 3 BASE: Performed by: ORTHOPAEDIC SURGERY

## 2024-08-19 PROCEDURE — 7100000000 HC PACU RECOVERY - FIRST 15 MIN: Performed by: ORTHOPAEDIC SURGERY

## 2024-08-19 PROCEDURE — A4217 STERILE WATER/SALINE, 500 ML: HCPCS | Performed by: ORTHOPAEDIC SURGERY

## 2024-08-19 PROCEDURE — G0378 HOSPITAL OBSERVATION PER HR: HCPCS

## 2024-08-19 PROCEDURE — 2580000003 HC RX 258: Performed by: ORTHOPAEDIC SURGERY

## 2024-08-19 PROCEDURE — 6360000002 HC RX W HCPCS: Performed by: ORTHOPAEDIC SURGERY

## 2024-08-19 PROCEDURE — 64447 NJX AA&/STRD FEMORAL NRV IMG: CPT | Performed by: ANESTHESIOLOGY

## 2024-08-19 PROCEDURE — 3700000001 HC ADD 15 MINUTES (ANESTHESIA): Performed by: ORTHOPAEDIC SURGERY

## 2024-08-19 PROCEDURE — 3700000000 HC ANESTHESIA ATTENDED CARE: Performed by: ORTHOPAEDIC SURGERY

## 2024-08-19 PROCEDURE — 2720000010 HC SURG SUPPLY STERILE: Performed by: ORTHOPAEDIC SURGERY

## 2024-08-19 PROCEDURE — 6360000002 HC RX W HCPCS: Performed by: ANESTHESIOLOGY

## 2024-08-19 PROCEDURE — 6370000000 HC RX 637 (ALT 250 FOR IP): Performed by: NURSE ANESTHETIST, CERTIFIED REGISTERED

## 2024-08-19 PROCEDURE — C9290 INJ, BUPIVACAINE LIPOSOME: HCPCS | Performed by: PHYSICIAN ASSISTANT

## 2024-08-19 PROCEDURE — 2500000003 HC RX 250 WO HCPCS: Performed by: PHYSICIAN ASSISTANT

## 2024-08-19 PROCEDURE — 2709999900 HC NON-CHARGEABLE SUPPLY: Performed by: ORTHOPAEDIC SURGERY

## 2024-08-19 PROCEDURE — 7100000001 HC PACU RECOVERY - ADDTL 15 MIN: Performed by: ORTHOPAEDIC SURGERY

## 2024-08-19 PROCEDURE — 3600000013 HC SURGERY LEVEL 3 ADDTL 15MIN: Performed by: ORTHOPAEDIC SURGERY

## 2024-08-19 PROCEDURE — 6360000002 HC RX W HCPCS: Performed by: NURSE ANESTHETIST, CERTIFIED REGISTERED

## 2024-08-19 PROCEDURE — 2500000003 HC RX 250 WO HCPCS: Performed by: NURSE ANESTHETIST, CERTIFIED REGISTERED

## 2024-08-19 PROCEDURE — 2580000003 HC RX 258: Performed by: NURSE ANESTHETIST, CERTIFIED REGISTERED

## 2024-08-19 PROCEDURE — 2580000003 HC RX 258: Performed by: PHYSICIAN ASSISTANT

## 2024-08-19 PROCEDURE — 97116 GAIT TRAINING THERAPY: CPT

## 2024-08-19 PROCEDURE — 6370000000 HC RX 637 (ALT 250 FOR IP): Performed by: ORTHOPAEDIC SURGERY

## 2024-08-19 PROCEDURE — 97161 PT EVAL LOW COMPLEX 20 MIN: CPT

## 2024-08-19 PROCEDURE — C1776 JOINT DEVICE (IMPLANTABLE): HCPCS | Performed by: ORTHOPAEDIC SURGERY

## 2024-08-19 PROCEDURE — 73560 X-RAY EXAM OF KNEE 1 OR 2: CPT

## 2024-08-19 PROCEDURE — 6370000000 HC RX 637 (ALT 250 FOR IP): Performed by: PHYSICIAN ASSISTANT

## 2024-08-19 PROCEDURE — 2500000003 HC RX 250 WO HCPCS: Performed by: ORTHOPAEDIC SURGERY

## 2024-08-19 PROCEDURE — C1713 ANCHOR/SCREW BN/BN,TIS/BN: HCPCS | Performed by: ORTHOPAEDIC SURGERY

## 2024-08-19 DEVICE — GENESIS II NON-POROUS TIBIAL                                    BASEPLATE SIZE 2 RIGHT
Type: IMPLANTABLE DEVICE | Site: KNEE | Status: FUNCTIONAL
Brand: GENESIS II

## 2024-08-19 DEVICE — LEGION POSTERIOR STABILIZED                                    OXINIUM FEMORAL SIZE 4 RIGHT
Type: IMPLANTABLE DEVICE | Site: KNEE | Status: FUNCTIONAL
Brand: LEGION

## 2024-08-19 DEVICE — CEMENT BNE 20ML 41GM FULL DOSE PMMA W/ TOBRA M VISC RADPQ: Type: IMPLANTABLE DEVICE | Site: KNEE | Status: FUNCTIONAL

## 2024-08-19 DEVICE — LEGION PS HIGH FLEX XLPE SZ 1-2 11MM
Type: IMPLANTABLE DEVICE | Site: KNEE | Status: FUNCTIONAL
Brand: LEGION

## 2024-08-19 DEVICE — IMPLANT PATELLAR DIA29MM THK9MM X3 ASYMMETRIC TRIATHLON: Type: IMPLANTABLE DEVICE | Site: PATELLA | Status: FUNCTIONAL

## 2024-08-19 DEVICE — GENESIS PIN AND DRILL SET
Type: IMPLANTABLE DEVICE | Site: KNEE | Status: FUNCTIONAL
Brand: GENESIS

## 2024-08-19 RX ORDER — ACETAMINOPHEN 500 MG
1000 TABLET ORAL
Status: COMPLETED | OUTPATIENT
Start: 2024-08-19 | End: 2024-08-19

## 2024-08-19 RX ORDER — ONDANSETRON 4 MG/1
4 TABLET, FILM COATED ORAL EVERY 8 HOURS PRN
Qty: 30 TABLET | Refills: 2 | Status: SHIPPED | OUTPATIENT
Start: 2024-08-19

## 2024-08-19 RX ORDER — SODIUM CHLORIDE 9 MG/ML
INJECTION, SOLUTION INTRAVENOUS CONTINUOUS
Status: DISCONTINUED | OUTPATIENT
Start: 2024-08-19 | End: 2024-08-19 | Stop reason: HOSPADM

## 2024-08-19 RX ORDER — ASPIRIN 81 MG/1
81 TABLET ORAL 2 TIMES DAILY
Qty: 60 TABLET | Refills: 3 | Status: SHIPPED | OUTPATIENT
Start: 2024-08-19

## 2024-08-19 RX ORDER — ONDANSETRON 2 MG/ML
INJECTION INTRAMUSCULAR; INTRAVENOUS PRN
Status: DISCONTINUED | OUTPATIENT
Start: 2024-08-19 | End: 2024-08-19 | Stop reason: SDUPTHER

## 2024-08-19 RX ORDER — OXYCODONE HYDROCHLORIDE 5 MG/1
5 TABLET ORAL
Status: DISCONTINUED | OUTPATIENT
Start: 2024-08-19 | End: 2024-08-19 | Stop reason: HOSPADM

## 2024-08-19 RX ORDER — OXYCODONE HYDROCHLORIDE 5 MG/1
2.5 TABLET ORAL
Status: DISCONTINUED | OUTPATIENT
Start: 2024-08-19 | End: 2024-08-19 | Stop reason: HOSPADM

## 2024-08-19 RX ORDER — LIDOCAINE HYDROCHLORIDE 10 MG/ML
1 INJECTION, SOLUTION EPIDURAL; INFILTRATION; INTRACAUDAL; PERINEURAL
Status: COMPLETED | OUTPATIENT
Start: 2024-08-19 | End: 2024-08-19

## 2024-08-19 RX ORDER — PREGABALIN 75 MG/1
75 CAPSULE ORAL
Status: COMPLETED | OUTPATIENT
Start: 2024-08-19 | End: 2024-08-19

## 2024-08-19 RX ORDER — MAGNESIUM HYDROXIDE 1200 MG/15ML
LIQUID ORAL CONTINUOUS PRN
Status: DISCONTINUED | OUTPATIENT
Start: 2024-08-19 | End: 2024-08-19 | Stop reason: HOSPADM

## 2024-08-19 RX ORDER — SENNA AND DOCUSATE SODIUM 50; 8.6 MG/1; MG/1
1 TABLET, FILM COATED ORAL 2 TIMES DAILY
Status: DISCONTINUED | OUTPATIENT
Start: 2024-08-19 | End: 2024-08-19 | Stop reason: HOSPADM

## 2024-08-19 RX ORDER — CEFADROXIL 500 MG/1
500 CAPSULE ORAL 2 TIMES DAILY
Qty: 10 CAPSULE | Refills: 0 | Status: SHIPPED | OUTPATIENT
Start: 2024-08-19 | End: 2024-08-24

## 2024-08-19 RX ORDER — DEXAMETHASONE SODIUM PHOSPHATE 4 MG/ML
INJECTION, SOLUTION INTRA-ARTICULAR; INTRALESIONAL; INTRAMUSCULAR; INTRAVENOUS; SOFT TISSUE PRN
Status: DISCONTINUED | OUTPATIENT
Start: 2024-08-19 | End: 2024-08-19 | Stop reason: SDUPTHER

## 2024-08-19 RX ORDER — SODIUM CHLORIDE 0.9 % (FLUSH) 0.9 %
5-40 SYRINGE (ML) INJECTION EVERY 12 HOURS SCHEDULED
Status: DISCONTINUED | OUTPATIENT
Start: 2024-08-19 | End: 2024-08-19 | Stop reason: HOSPADM

## 2024-08-19 RX ORDER — ACETAMINOPHEN 500 MG
1000 TABLET ORAL EVERY 8 HOURS PRN
Qty: 90 TABLET | Refills: 1 | Status: SHIPPED | OUTPATIENT
Start: 2024-08-19

## 2024-08-19 RX ORDER — TIMOLOL MALEATE 5 MG/ML
1 SOLUTION/ DROPS OPHTHALMIC DAILY
Status: DISCONTINUED | OUTPATIENT
Start: 2024-08-20 | End: 2024-08-19 | Stop reason: HOSPADM

## 2024-08-19 RX ORDER — SODIUM CHLORIDE 9 MG/ML
INJECTION, SOLUTION INTRAVENOUS PRN
Status: DISCONTINUED | OUTPATIENT
Start: 2024-08-19 | End: 2024-08-19 | Stop reason: HOSPADM

## 2024-08-19 RX ORDER — SODIUM CHLORIDE 0.9 % (FLUSH) 0.9 %
5-40 SYRINGE (ML) INJECTION PRN
Status: DISCONTINUED | OUTPATIENT
Start: 2024-08-19 | End: 2024-08-19 | Stop reason: HOSPADM

## 2024-08-19 RX ORDER — ROCURONIUM BROMIDE 10 MG/ML
INJECTION, SOLUTION INTRAVENOUS PRN
Status: DISCONTINUED | OUTPATIENT
Start: 2024-08-19 | End: 2024-08-19 | Stop reason: SDUPTHER

## 2024-08-19 RX ORDER — ACETAMINOPHEN 500 MG
1000 TABLET ORAL EVERY 6 HOURS
Status: DISCONTINUED | OUTPATIENT
Start: 2024-08-19 | End: 2024-08-19 | Stop reason: HOSPADM

## 2024-08-19 RX ORDER — DEXAMETHASONE SODIUM PHOSPHATE 4 MG/ML
8 INJECTION, SOLUTION INTRA-ARTICULAR; INTRALESIONAL; INTRAMUSCULAR; INTRAVENOUS; SOFT TISSUE
Status: COMPLETED | OUTPATIENT
Start: 2024-08-19 | End: 2024-08-19

## 2024-08-19 RX ORDER — GLUCAGON 1 MG
1 KIT INJECTION PRN
Status: DISCONTINUED | OUTPATIENT
Start: 2024-08-19 | End: 2024-08-19 | Stop reason: HOSPADM

## 2024-08-19 RX ORDER — FENTANYL CITRATE 50 UG/ML
INJECTION, SOLUTION INTRAMUSCULAR; INTRAVENOUS PRN
Status: DISCONTINUED | OUTPATIENT
Start: 2024-08-19 | End: 2024-08-19 | Stop reason: SDUPTHER

## 2024-08-19 RX ORDER — LIDOCAINE HYDROCHLORIDE 20 MG/ML
INJECTION, SOLUTION EPIDURAL; INFILTRATION; INTRACAUDAL; PERINEURAL PRN
Status: DISCONTINUED | OUTPATIENT
Start: 2024-08-19 | End: 2024-08-19 | Stop reason: SDUPTHER

## 2024-08-19 RX ORDER — PROCHLORPERAZINE EDISYLATE 5 MG/ML
5 INJECTION INTRAMUSCULAR; INTRAVENOUS
Status: COMPLETED | OUTPATIENT
Start: 2024-08-19 | End: 2024-08-19

## 2024-08-19 RX ORDER — ROPIVACAINE HYDROCHLORIDE 5 MG/ML
INJECTION, SOLUTION EPIDURAL; INFILTRATION; PERINEURAL
Status: COMPLETED | OUTPATIENT
Start: 2024-08-19 | End: 2024-08-19

## 2024-08-19 RX ORDER — ONDANSETRON 2 MG/ML
4 INJECTION INTRAMUSCULAR; INTRAVENOUS EVERY 6 HOURS PRN
Status: DISCONTINUED | OUTPATIENT
Start: 2024-08-19 | End: 2024-08-19 | Stop reason: HOSPADM

## 2024-08-19 RX ORDER — LATANOPROST 50 UG/ML
1 SOLUTION/ DROPS OPHTHALMIC
Status: DISCONTINUED | OUTPATIENT
Start: 2024-08-19 | End: 2024-08-19 | Stop reason: HOSPADM

## 2024-08-19 RX ORDER — ONDANSETRON 4 MG/1
4 TABLET, ORALLY DISINTEGRATING ORAL EVERY 6 HOURS PRN
Status: DISCONTINUED | OUTPATIENT
Start: 2024-08-19 | End: 2024-08-19 | Stop reason: HOSPADM

## 2024-08-19 RX ORDER — CELECOXIB 100 MG/1
200 CAPSULE ORAL
Status: COMPLETED | OUTPATIENT
Start: 2024-08-19 | End: 2024-08-19

## 2024-08-19 RX ORDER — ROPIVACAINE HYDROCHLORIDE 2 MG/ML
30 INJECTION, SOLUTION EPIDURAL; INFILTRATION; PERINEURAL ONCE
Status: DISCONTINUED | OUTPATIENT
Start: 2024-08-19 | End: 2024-08-19

## 2024-08-19 RX ORDER — HYDROCHLOROTHIAZIDE 25 MG/1
12.5 TABLET ORAL EVERY MORNING
Status: DISCONTINUED | OUTPATIENT
Start: 2024-08-19 | End: 2024-08-19 | Stop reason: HOSPADM

## 2024-08-19 RX ORDER — PHENYLEPHRINE HCL IN 0.9% NACL 1 MG/10 ML
SYRINGE (ML) INTRAVENOUS PRN
Status: DISCONTINUED | OUTPATIENT
Start: 2024-08-19 | End: 2024-08-19 | Stop reason: SDUPTHER

## 2024-08-19 RX ORDER — TAMSULOSIN HYDROCHLORIDE 0.4 MG/1
0.4 CAPSULE ORAL
Status: COMPLETED | OUTPATIENT
Start: 2024-08-19 | End: 2024-08-19

## 2024-08-19 RX ORDER — ONDANSETRON 2 MG/ML
4 INJECTION INTRAMUSCULAR; INTRAVENOUS
Status: COMPLETED | OUTPATIENT
Start: 2024-08-19 | End: 2024-08-19

## 2024-08-19 RX ORDER — OXYCODONE HYDROCHLORIDE 5 MG/1
5 TABLET ORAL EVERY 4 HOURS PRN
Qty: 42 TABLET | Refills: 0 | Status: SHIPPED | OUTPATIENT
Start: 2024-08-19 | End: 2024-08-26

## 2024-08-19 RX ORDER — SODIUM CHLORIDE, SODIUM LACTATE, POTASSIUM CHLORIDE, CALCIUM CHLORIDE 600; 310; 30; 20 MG/100ML; MG/100ML; MG/100ML; MG/100ML
INJECTION, SOLUTION INTRAVENOUS CONTINUOUS
Status: DISCONTINUED | OUTPATIENT
Start: 2024-08-19 | End: 2024-08-19 | Stop reason: HOSPADM

## 2024-08-19 RX ORDER — NALOXONE HYDROCHLORIDE 0.4 MG/ML
INJECTION, SOLUTION INTRAMUSCULAR; INTRAVENOUS; SUBCUTANEOUS PRN
Status: DISCONTINUED | OUTPATIENT
Start: 2024-08-19 | End: 2024-08-19 | Stop reason: HOSPADM

## 2024-08-19 RX ORDER — ROPIVACAINE HYDROCHLORIDE 5 MG/ML
30 INJECTION, SOLUTION EPIDURAL; INFILTRATION; PERINEURAL ONCE
Status: COMPLETED | OUTPATIENT
Start: 2024-08-19 | End: 2024-08-19

## 2024-08-19 RX ORDER — DIPHENHYDRAMINE HYDROCHLORIDE 50 MG/ML
12.5 INJECTION INTRAMUSCULAR; INTRAVENOUS
Status: DISCONTINUED | OUTPATIENT
Start: 2024-08-19 | End: 2024-08-19 | Stop reason: HOSPADM

## 2024-08-19 RX ORDER — POLYETHYLENE GLYCOL 3350 17 G/17G
17 POWDER, FOR SOLUTION ORAL DAILY PRN
Status: DISCONTINUED | OUTPATIENT
Start: 2024-08-19 | End: 2024-08-19 | Stop reason: HOSPADM

## 2024-08-19 RX ORDER — FENTANYL CITRATE 50 UG/ML
50 INJECTION, SOLUTION INTRAMUSCULAR; INTRAVENOUS EVERY 5 MIN PRN
Status: DISCONTINUED | OUTPATIENT
Start: 2024-08-19 | End: 2024-08-19 | Stop reason: HOSPADM

## 2024-08-19 RX ORDER — FENTANYL CITRATE 50 UG/ML
100 INJECTION, SOLUTION INTRAMUSCULAR; INTRAVENOUS
Status: COMPLETED | OUTPATIENT
Start: 2024-08-19 | End: 2024-08-19

## 2024-08-19 RX ORDER — PROPOFOL 10 MG/ML
INJECTION, EMULSION INTRAVENOUS PRN
Status: DISCONTINUED | OUTPATIENT
Start: 2024-08-19 | End: 2024-08-19 | Stop reason: SDUPTHER

## 2024-08-19 RX ORDER — AMLODIPINE BESYLATE 5 MG/1
5 TABLET ORAL DAILY
Status: DISCONTINUED | OUTPATIENT
Start: 2024-08-19 | End: 2024-08-19 | Stop reason: HOSPADM

## 2024-08-19 RX ORDER — FAMOTIDINE 20 MG/1
20 TABLET, FILM COATED ORAL ONCE
Status: COMPLETED | OUTPATIENT
Start: 2024-08-19 | End: 2024-08-19

## 2024-08-19 RX ORDER — KETOROLAC TROMETHAMINE 15 MG/ML
15 INJECTION, SOLUTION INTRAMUSCULAR; INTRAVENOUS EVERY 6 HOURS
Status: DISCONTINUED | OUTPATIENT
Start: 2024-08-19 | End: 2024-08-19 | Stop reason: HOSPADM

## 2024-08-19 RX ORDER — CELECOXIB 200 MG/1
200 CAPSULE ORAL 2 TIMES DAILY
Qty: 60 CAPSULE | Refills: 2 | Status: SHIPPED | OUTPATIENT
Start: 2024-08-19

## 2024-08-19 RX ORDER — ASPIRIN 81 MG/1
81 TABLET ORAL 2 TIMES DAILY
Status: DISCONTINUED | OUTPATIENT
Start: 2024-08-20 | End: 2024-08-19 | Stop reason: HOSPADM

## 2024-08-19 RX ORDER — MIDAZOLAM HYDROCHLORIDE 2 MG/2ML
2 INJECTION, SOLUTION INTRAMUSCULAR; INTRAVENOUS
Status: COMPLETED | OUTPATIENT
Start: 2024-08-19 | End: 2024-08-19

## 2024-08-19 RX ORDER — DEXTROSE MONOHYDRATE 100 MG/ML
INJECTION, SOLUTION INTRAVENOUS CONTINUOUS PRN
Status: DISCONTINUED | OUTPATIENT
Start: 2024-08-19 | End: 2024-08-19 | Stop reason: HOSPADM

## 2024-08-19 RX ORDER — FAMOTIDINE 20 MG/1
20 TABLET, FILM COATED ORAL DAILY
Status: DISCONTINUED | OUTPATIENT
Start: 2024-08-19 | End: 2024-08-19 | Stop reason: HOSPADM

## 2024-08-19 RX ADMIN — WATER 2000 MG: 1 INJECTION INTRAMUSCULAR; INTRAVENOUS; SUBCUTANEOUS at 17:06

## 2024-08-19 RX ADMIN — Medication 100 MCG: at 11:14

## 2024-08-19 RX ADMIN — FENTANYL CITRATE 50 MCG: 50 INJECTION INTRAMUSCULAR; INTRAVENOUS at 11:39

## 2024-08-19 RX ADMIN — TRANEXAMIC ACID 1000 MG: 100 INJECTION, SOLUTION INTRAVENOUS at 10:16

## 2024-08-19 RX ADMIN — PROPOFOL 100 MG: 10 INJECTION, EMULSION INTRAVENOUS at 10:14

## 2024-08-19 RX ADMIN — KETOROLAC TROMETHAMINE 15 MG: 15 INJECTION, SOLUTION INTRAMUSCULAR; INTRAVENOUS at 14:22

## 2024-08-19 RX ADMIN — HYDROMORPHONE HYDROCHLORIDE 0.25 MG: 1 INJECTION, SOLUTION INTRAMUSCULAR; INTRAVENOUS; SUBCUTANEOUS at 12:41

## 2024-08-19 RX ADMIN — ACETAMINOPHEN 1000 MG: 500 TABLET ORAL at 14:22

## 2024-08-19 RX ADMIN — WATER 2000 MG: 1 INJECTION INTRAMUSCULAR; INTRAVENOUS; SUBCUTANEOUS at 10:17

## 2024-08-19 RX ADMIN — FENTANYL CITRATE 50 MCG: 50 INJECTION INTRAMUSCULAR; INTRAVENOUS at 10:30

## 2024-08-19 RX ADMIN — FAMOTIDINE 20 MG: 20 TABLET ORAL at 17:06

## 2024-08-19 RX ADMIN — SODIUM CHLORIDE, SODIUM LACTATE, POTASSIUM CHLORIDE, AND CALCIUM CHLORIDE: 600; 310; 30; 20 INJECTION, SOLUTION INTRAVENOUS at 11:28

## 2024-08-19 RX ADMIN — ACETAMINOPHEN 1000 MG: 500 TABLET ORAL at 08:37

## 2024-08-19 RX ADMIN — ONDANSETRON 4 MG: 2 INJECTION INTRAMUSCULAR; INTRAVENOUS at 17:39

## 2024-08-19 RX ADMIN — DEXAMETHASONE SODIUM PHOSPHATE 4 MG: 4 INJECTION INTRA-ARTICULAR; INTRALESIONAL; INTRAMUSCULAR; INTRAVENOUS; SOFT TISSUE at 10:24

## 2024-08-19 RX ADMIN — ROPIVACAINE HYDROCHLORIDE 20 ML: 5 INJECTION, SOLUTION EPIDURAL; INFILTRATION; PERINEURAL at 09:40

## 2024-08-19 RX ADMIN — DEXAMETHASONE SODIUM PHOSPHATE 8 MG: 4 INJECTION INTRA-ARTICULAR; INTRALESIONAL; INTRAMUSCULAR; INTRAVENOUS; SOFT TISSUE at 08:43

## 2024-08-19 RX ADMIN — TAMSULOSIN HYDROCHLORIDE 0.4 MG: 0.4 CAPSULE ORAL at 08:37

## 2024-08-19 RX ADMIN — ROPIVACAINE HYDROCHLORIDE 30 ML: 5 INJECTION EPIDURAL; INFILTRATION; PERINEURAL at 09:45

## 2024-08-19 RX ADMIN — SODIUM CHLORIDE, SODIUM LACTATE, POTASSIUM CHLORIDE, AND CALCIUM CHLORIDE: 600; 310; 30; 20 INJECTION, SOLUTION INTRAVENOUS at 08:34

## 2024-08-19 RX ADMIN — TRANEXAMIC ACID 1000 MG: 100 INJECTION, SOLUTION INTRAVENOUS at 11:29

## 2024-08-19 RX ADMIN — FAMOTIDINE 20 MG: 20 TABLET ORAL at 08:37

## 2024-08-19 RX ADMIN — KETOROLAC TROMETHAMINE: 15 INJECTION, SOLUTION INTRAMUSCULAR; INTRAVENOUS at 11:12

## 2024-08-19 RX ADMIN — ONDANSETRON 4 MG: 2 INJECTION INTRAMUSCULAR; INTRAVENOUS at 10:24

## 2024-08-19 RX ADMIN — ROCURONIUM BROMIDE 50 MG: 50 INJECTION INTRAVENOUS at 10:14

## 2024-08-19 RX ADMIN — CELECOXIB 200 MG: 100 CAPSULE ORAL at 08:37

## 2024-08-19 RX ADMIN — ONDANSETRON 4 MG: 2 INJECTION INTRAMUSCULAR; INTRAVENOUS at 12:20

## 2024-08-19 RX ADMIN — SUGAMMADEX 200 MG: 100 INJECTION, SOLUTION INTRAVENOUS at 11:29

## 2024-08-19 RX ADMIN — LIDOCAINE HYDROCHLORIDE 2 ML: 10 INJECTION, SOLUTION EPIDURAL; INFILTRATION; INTRACAUDAL; PERINEURAL at 09:44

## 2024-08-19 RX ADMIN — MIDAZOLAM 2 MG: 1 INJECTION INTRAMUSCULAR; INTRAVENOUS at 09:42

## 2024-08-19 RX ADMIN — FENTANYL CITRATE 100 MCG: 50 INJECTION INTRAMUSCULAR; INTRAVENOUS at 09:42

## 2024-08-19 RX ADMIN — PROCHLORPERAZINE EDISYLATE 5 MG: 5 INJECTION INTRAMUSCULAR; INTRAVENOUS at 12:42

## 2024-08-19 RX ADMIN — LIDOCAINE HYDROCHLORIDE 100 MG: 20 INJECTION, SOLUTION EPIDURAL; INFILTRATION; INTRACAUDAL; PERINEURAL at 10:14

## 2024-08-19 RX ADMIN — PREGABALIN 75 MG: 75 CAPSULE ORAL at 08:37

## 2024-08-19 ASSESSMENT — PAIN DESCRIPTION - ORIENTATION
ORIENTATION: RIGHT
ORIENTATION: RIGHT

## 2024-08-19 ASSESSMENT — PAIN DESCRIPTION - LOCATION
LOCATION: KNEE
LOCATION: KNEE

## 2024-08-19 ASSESSMENT — PAIN DESCRIPTION - DESCRIPTORS
DESCRIPTORS: SORE
DESCRIPTORS: SORE

## 2024-08-19 ASSESSMENT — PAIN SCALES - GENERAL
PAINLEVEL_OUTOF10: 5
PAINLEVEL_OUTOF10: 7
PAINLEVEL_OUTOF10: 0

## 2024-08-19 ASSESSMENT — PAIN - FUNCTIONAL ASSESSMENT: PAIN_FUNCTIONAL_ASSESSMENT: 0-10

## 2024-08-19 NOTE — CARE COORDINATION
08/19/24 1336   IMM Letter   Observation Status Letter date given: 08/19/24   Observation Status Letter time given: 1336   Observation Status Letter given to Patient/Family/Significant other/Guardian/POA/by: Seema Mohr RN CM, Copy to Patient, Original on Hard Chart.

## 2024-08-19 NOTE — DISCHARGE SUMMARY
8/19/2024  7:03 AM    8/19/2024, 11:54 AM    Primary Dx:right Orthopedic / Rheumatologic: Total Knee Replacement  Secondary Dx: Etiological Diagnoses: none    HPI:  Pt has end stage OA of their right knee and had failed conservative treatment.  Due to the current findings and affected activity of daily living surgical intervention is indicated.  The alternatives, risks, complications as well as expected outcome were discussed, the patient understands and wishes to proceed with surgery    Past Medical History:   Diagnosis Date    Abnormal EKG     Arthritis     Hypertension        No current facility-administered medications for this encounter.    Facility-Administered Medications Ordered in Other Encounters:     rocuronium (ZEMURON) injection, , IntraVENous, PRN, Isha, Mayi E, APRN - CRNA, 50 mg at 08/19/24 1014    lidocaine PF 2 % injection, , IntraVENous, PRN, Isha, Mayi E, APRN - CRNA, 100 mg at 08/19/24 1014    propofol infusion, , IntraVENous, PRN, Isha, Mayi E, APRN - CRNA, 100 mg at 08/19/24 1014    ondansetron (ZOFRAN) injection, , IntraVENous, PRN, Isha, Mayi E, APRN - CRNA, 4 mg at 08/19/24 1024    dexAMETHasone (DECADRON) injection, , IntraVENous, PRN, Isha, Mayi E, APRN - CRNA, 4 mg at 08/19/24 1024    fentaNYL (SUBLIMAZE) injection, , IntraVENous, PRN, Isha, Mayi E, APRN - CRNA, 50 mcg at 08/19/24 1139    phenylephrine (STEPHANIE-SYNEPHRINE) 1 MG/10ML prefilled syringe (Push Dose), , IntraVENous, PRN, Isha, Mayi E, APRN - CRNA, 100 mcg at 08/19/24 1114    sugammadex (BRIDION) 200 MG/2ML injection, , IntraVENous, PRN, Isha, Mayi E, APRN - CRNA, 200 mg at 08/19/24 1129    Iodine    Physical Exam:  General A&O x3 NAD, well developed, well nourished, normal affect  Heart: S1-S2, RRR  Lungs: CTA Bilat  Abd: soft NT, ND  Ext: n/v intact    Hospital Course:    Pt. Had rightOrthopedic / Rheumatologic: Total Knee Replacement    Post -op Course:  The patient tolerated the procedure well.

## 2024-08-19 NOTE — NURSE NAVIGATOR
Per patient request patient assisted to restroom. Patient unable to keep eyes open, kept saying she feels horrible. Patient seated on toilet and began falling asleep holding on to walker. Rn called for PT to assist. Bed had already been requested as patient will  be unable to discharge today due to nausea and sedation. PT and coordinator assisted patient back into bed , call bell placed within reach. Patient was provided with education book, medication education sheet and a rolling walker. Will see patient in the morning.

## 2024-08-19 NOTE — ANESTHESIA POSTPROCEDURE EVALUATION
Department of Anesthesiology  Postprocedure Note    Patient: Charlotte Schwarz  MRN: [id number]  YOB: 1950  Date of evaluation: 8/19/2024    Procedure Summary       Date: 08/19/24 Room / Location: [facility] MAIN 07 / [facility] MAIN OR    Anesthesia Start: 1007 Anesthesia Stop: 1158    Procedure: Right total knee arthroplasty (Right: Knee) Diagnosis:       Osteoarthritis of right knee      (Osteoarthritis of right knee [M17.11])    Surgeons: Jon Boles MD Responsible Provider: Salo Falcon MD    Anesthesia Type: General, Regional ASA Status: 3            Anesthesia Type: General, Regional    Carolina Phase I: Carolina Score: 8    Carolina Phase II:      Anesthesia Post Evaluation    Patient location during evaluation: PACU  Patient participation: complete - patient participated  Level of consciousness: awake  Airway patency: patent  Nausea & Vomiting: no nausea  Cardiovascular status: hemodynamically stable  Respiratory status: acceptable  Hydration status: euvolemic  Pain management: adequate    No notable events documented.

## 2024-08-19 NOTE — HOME CARE
Received home health referral for [facility] for PT,Ramana Knee protocol. Discharge order noted for today; spoke to patient's spouse (Joshua) over the phone, Verified demographics,explained HH services  and answered all questions; Patient spouse states patient was provided a RW today ; HH referral processed to [facility] central Intake and scheduling .JENNY HERNÁNDEZ.

## 2024-08-19 NOTE — ANESTHESIA PROCEDURE NOTES
Peripheral Block    Patient location during procedure: pre-op  Reason for block: post-op pain management and at surgeon's request  Start time: 8/19/2024 9:40 AM  End time: 8/19/2024 9:48 AM  Staffing  Performed: anesthesiologist   Performed by: Salo Falcon MD  Authorized by: Salo Falcon MD    Preanesthetic Checklist  Completed: patient identified, IV checked, site marked, risks and benefits discussed, surgical/procedural consents, equipment checked, pre-op evaluation, timeout performed, anesthesia consent given, oxygen available, monitors applied/VS acknowledged, fire risk safety assessment completed and verbalized and blood product R/B/A discussed and consented  Peripheral Block   Patient position: supine  Prep: ChloraPrep  Provider prep: mask and sterile gloves  Patient monitoring: cardiac monitor, continuous pulse ox, frequent blood pressure checks, IV access, oxygen and responsive to questions  Block type: Femoral  Adductor canal  Laterality: right  Injection technique: single-shot  Guidance: ultrasound guided  Local infiltration: lidocaine  Infiltration strength: 1 %  Local infiltration: lidocaine  Dose: 2 mL    Needle   Needle type: insulated echogenic nerve stimulator needle   Needle gauge: 21 G  Needle localization: ultrasound guidance  Needle length: 10 cm  Assessment   Injection assessment: negative aspiration for heme, no paresthesia on injection, local visualized surrounding nerve on ultrasound and no intravascular symptoms  Paresthesia pain: none  Slow fractionated injection: yes  Hemodynamics: stable  Outcomes: uncomplicated and patient tolerated procedure well    Medications Administered  ropivacaine (NAROPIN) injection 0.5% - Perineural   20 mL - 8/19/2024 9:40:00 AM

## 2024-08-19 NOTE — INTERVAL H&P NOTE
Update History & Physical    The patient's History and Physical of August 19, 2024 was reviewed with the patient and I examined the patient. There was no change. The surgical site was confirmed by the patient and me.     Plan: The risks, benefits, expected outcome, and alternative to the recommended procedure have been discussed with the patient. Patient understands and wants to proceed with the procedure.     Electronically signed by AAKASH BENITO MD on 8/19/2024 at 9:28 AM

## 2024-08-19 NOTE — PERIOP NOTE
Patient /Family /Designee has been informed that [facility] is not responsible for patient belongings per policy and the signed [facility] Patient Agreement document.  Personal items should be sent home or checked in with security.  Patient /Family /Designee selected the following action:                            [x]  Send personal items home with a family member or friend                                                 []  Check in personal items with security, excluding clothing                            []  Maintain personal items at the bedside, against recommendation                                 by Tay Crane [facility]                                   ** If patient /family /designee chooses to maintain personal items at the bedside,                                      Complete the patient belongings inventory in the EMR.

## 2024-08-19 NOTE — PROGRESS NOTES
OT order received and chart reviewed. 1425, Unable to see patient at this time as pt actively vomiting. 1553, per ortho coordinator note, pt lethargic, unable to maintain eyes open falling asleep on commode. Pt not safe for skilled OT evaluation at this time. Will continue to follow and see pt when available/as appropriate.          Thank you for this referral,   Brennen Hurtado MS, OTR/L

## 2024-08-19 NOTE — CARE COORDINATION
Went to patient room, reintroduced self and role, patient HIPAA verified. Advised patient is recommended [facility] for Skilled Nursing and PT. Star rated Choice Letter and List Provided. Patient chooses [facility]. Letter completed and placed on hard chart.     Patient and Spouse were without any questions or concerns. Bedside Nurse entering room to discharge patient.       Referral sent to [facility], spoke to Roni Starr at x [phone] earlier this shift who already agreed to accept patient if they were choice.

## 2024-08-19 NOTE — PERIOP NOTE
TRANSFER - OUT REPORT:    Verbal report given to Chana on Charlotte Schwarz  being transferred to [facility] for routine post-op       Report consisted of patient's Situation, Background, Assessment and   Recommendations(SBAR).     Information from the following report(s) Nurse Handoff Report, Adult Overview, Surgery Report, and MAR was reviewed with the receiving nurse.           Lines:   Peripheral IV 08/19/24 Left;Posterior Forearm (Active)   Site Assessment Clean, dry & intact 08/19/24 1157   Line Status Infusing 08/19/24 1157   Line Care Connections checked and tightened 08/19/24 1157   Phlebitis Assessment No symptoms 08/19/24 1157   Infiltration Assessment 0 08/19/24 1157   Alcohol Cap Used No 08/19/24 1157   Dressing Status Clean, dry & intact 08/19/24 1157   Dressing Type Transparent 08/19/24 1157        Opportunity for questions and clarification was provided.      Patient transported with:  Hospital Transporter

## 2024-08-19 NOTE — CARE COORDINATION
Met with Patient at bedside. CM introduces self and explains role. Patient is alert and oriented x 4. Hipaa verified. Patient agrees to complete initial  assessment.     DCP: Home with HHS ( pending patients choice) and supportive  who will transport patient home via POV.     08/19/24 1336   Service Assessment   Patient Orientation Other (see comment)  (Patient is sleeping heavily,  assists with assessment.)   Cognition Other (see comment)  (Lethargic, in and out of sleep.)   History Provided By Patient;Spouse   Primary Caregiver Self   Accompanied By/Relationship Spouse, Gurjit Schwarz   Support Systems Spouse/Significant Other;/   Patient's Healthcare Decision Maker is: Legal Next of Kin   PCP Verified by CM Yes   Last Visit to PCP Within last 3 months   Prior Functional Level Independent in ADLs/IADLs   Current Functional Level Assistance with the following:;Housework;Shopping;Mobility;Toileting  (Transportation)   Can patient return to prior living arrangement Yes   Ability to make needs known: Fair   Family able to assist with home care needs: Yes   Would you like for me to discuss the discharge plan with any other family members/significant others, and if so, who? Yes  (Spouse, Gurjit Schawrz.)   Financial Resources Medicare   Community Resources None   Social/Functional History   Lives With Spouse   Type of Home House   Home Layout Two level;Able to Live on Main level with bedroom/bathroom   Home Access Stairs to enter without rails   Entrance Stairs - Number of Steps 5   Bathroom Shower/Tub Tub/Shower unit;Walk-in shower   Bathroom Toilet Standard   Bathroom Equipment None   Bathroom Accessibility Accessible   Home Equipment Cane;Rollator  (Blood Pressure Monitor.)   Receives Help From Family   ADL Assistance Independent   Toileting Needs assistance   Homemaking Assistance Needs assistance   Meal Prep Moderate   Laundry Moderate   Vacuuming Total   Cleaning Total

## 2024-08-19 NOTE — BRIEF OP NOTE
Brief Postoperative Note      Patient: Charlotte Schwarz  YOB: 1950  MRN: [id number]    Date of Procedure: 8/19/2024    Pre-Op Diagnosis Codes:      * Osteoarthritis of right knee [M17.11]    Post-Op Diagnosis: Same       Procedure(s):  Right total knee arthroplasty    Surgeon(s):  Jon Boles MD    Assistant:  Surgical Assistant: Binu Mcfarlane  Physician Assistant: Guerrero Tang PA-C    Anesthesia: General    Estimated Blood Loss (mL): less than 50     Complications: None    Specimens:   * No specimens in log *    Implants:  Implant Name Type Inv. Item Serial No.  Lot No. LRB No. Used Action   CEMENT BNE 20ML 41GM FULL DOSE PMMA W/ TOBRA M VISC RADPQ - [id number]  CEMENT BNE 20ML 41GM FULL DOSE PMMA W/ TOBRA M VISC RADPQ  [employer] [facility] [id number] Right 2 Implanted   SET PIN L76MM DIA3.2MM S STL FLUT 127X3.2MM DRL DISP GEN - [id number]  SET PIN L76MM DIA3.2MM S STL FLUT 127X3.2MM DRL DISP GEN  [employer] [facility]  Right 1 Implanted   BASEPLATE TIB SZ 2 AP45MM ML64MM THK2.3MM R KNEE TI ALLY NP - [id number]  BASEPLATE TIB SZ 2 AP45MM ML64MM THK2.3MM R KNEE TI ALLY NP  [employer]- [id number] Right 1 Implanted   COMPONENT FEM SZ 4 R KNEE OXINIUM POST STBL DOLLY LEGION - [id number]  COMPONENT FEM SZ 4 R KNEE OXINIUM POST STBL DOLLY LEGION  [employer]- [id number] Right 1 Implanted   IMPLANT PATELLAR OYY49UR THK9MM X3 ASYMMETRIC TRIATHLON - [id number]  IMPLANT PATELLAR [id number] THK9MM X3 ASYMMETRIC TRIATHLON  [employer] [facility] [id number] Right 1 Implanted   INSERT TIB SZ 1-2 RRN17AS KNEE XLPE POST STBL HI FLX LEGION - [id number]  INSERT TIB SZ 1-2 [id number] KNEE XLPE POST STBL HI FLX LEGION  [employer]- [id number] Right 1 Implanted         Drains: * No LDAs found *    Findings:  Infection Present At Time Of Surgery (PATOS) (choose all levels that have infection present):  No infection present  Other

## 2024-08-19 NOTE — OP NOTE
[address]  [address]                            OPERATIVE REPORT      PATIENT NAME: ENEDINA LIND           : 1950  MED REC NO: [id number]                       ROOM: 2214  ACCOUNT NO: [id number]                       ADMIT DATE: 2024  PROVIDER: Jon Boles MD    DATE OF SERVICE:  2024    PREOPERATIVE DIAGNOSES:  Severe end-stage arthritis, right knee.    POSTOPERATIVE DIAGNOSES:  Severe end-stage arthritis, right knee.    PROCEDURES PERFORMED:  Right total knee replacement using the [employer] Legion System with a size 4 right posterior stabilized femoral component Oxinium, a size 2 right tibial baseplate, an 11 mm size 1/2 posterior stabilized high flexion articular insert, and a 29 asymmetric patella.    SURGEON:  Jon Boles MD    ASSISTANT:       1. VALENCIA Tang, first assistant.     2. Binu Mcfarlane, second assistant.    ANESTHESIA:  Preoperative nerve block with light general.    Binu Osborn was the first assistant and assisted with all phases of surgery commencing with patient positioning, patient prep, patient drape, leg positioning during surgery, retracting, assisting with the surgery itself, closure, dressing placement, and transfer.    ESTIMATED BLOOD LOSS:  Less than 50.    SPECIMENS REMOVED:  None.    INTRAOPERATIVE FINDINGS:  Same.     COMPLICATIONS:  None.    IMPLANTS:  As above-mentioned.    INDICATIONS:  Same.    DESCRIPTION OF PROCEDURE:  After anesthetic was successfully induced, the patient did receive her antibiotics and a time-out was performed.  Midline incision, knee debrided in the usual fashion.  Femoral canal aspirated, lavaged, and reaspirated prior to instrumentation.  A modified gap balancing technique would be performed.  All construct purchases, clearance, and all soft tissue structures protected during the sawing process.  Crab claw was utilized to prevent

## 2024-08-19 NOTE — ANESTHESIA PRE PROCEDURE
chloride 0.9 % 110 mL IVPB (mini-bag)  1,000 mg IntraVENous Q2H Guerrero Tang PA-C        lidocaine PF 1 % injection 1 mL  1 mL IntraDERmal Once PRN Elsy Villavicencio APRN - CRNA        lactated ringers IV soln infusion   IntraVENous Continuous Elsy Villavicencio APRN - CRNA 125 mL/hr at 08/19/24 0834 New Bag at 08/19/24 0834    fentaNYL (SUBLIMAZE) injection 100 mcg  100 mcg IntraVENous Once PRN Elsy Villavicencio APRN - CRNA        midazolam PF (VERSED) injection 2 mg  2 mg IntraVENous Once PRN Elsy Villavicencio APRN - CRNA        ROPivacaine (NAROPIN) 0.5% injection 30 mL  30 mL Other Once Salo Falcon MD           Allergies:    Allergies   Allergen Reactions    Iodine Swelling       Problem List:    Patient Active Problem List   Diagnosis Code    Osteoarthritis of right knee M17.11       Past Medical History:        Diagnosis Date    Abnormal EKG     Arthritis     Hypertension        Past Surgical History:        Procedure Laterality Date    FOOT/TOES SURGERY PROC UNLISTED         Social History:    Social History     Tobacco Use    Smoking status: Never    Smokeless tobacco: Never   Substance Use Topics    Alcohol use: No                                Counseling given: Not Answered      Vital Signs (Current):   Vitals:    08/07/24 1414 08/19/24 0817   BP:  128/74   Pulse:  71   Resp:  18   Temp:  98.6 °F (37 °C)   TempSrc:  Oral   SpO2:  100%   Weight: 57.6 kg (127 lb) 57.1 kg (125 lb 12.8 oz)   Height: 1.499 m (4' 11\")                                               BP Readings from Last 3 Encounters:   08/19/24 128/74   08/13/24 124/82   08/07/24 129/76       NPO Status: Time of last liquid consumption: 0825                        Time of last solid consumption: 2000                        Date of last liquid consumption: 08/19/24                        Date of last solid food consumption: 08/18/24    BMI:   Wt Readings from Last 3 Encounters:   08/19/24 57.1 kg (125 lb 12.8 oz)   08/13/24 58.5 kg (129

## 2024-08-19 NOTE — PROGRESS NOTES
Patient admitted to the floor from PACU. She is drowsy, but easily aroused. Oriented to the room and how to use the call bell.

## 2024-08-19 NOTE — PLAN OF CARE
Problem: Physical Therapy - Adult  Goal: By Discharge: Performs mobility at highest level of function for planned discharge setting.  See evaluation for individualized goals.  Description: Initiated  8/19/24  to be met within 7-10 days.    1.  Patient will move from supine to sit and sit to supine  in bed with modified independence.    2.  Patient will transfer from bed to chair and chair to bed with modified independence using the least restrictive device.  3.  Patient will perform sit to stand with modified independence.  4.  Patient will ambulate with modified independence for 150 feet with the least restrictive device.   5.  Patient will ascend/descend 5 stairs with one handrail(s) with supervision/set-up.    PLOF: Pt lives with  in two story home, able to stay on main level, with 5 MAGDALENO with no handrails.  Pt was independent with all mobility with no AD, has a cane and rollator.    8/19/2024 1532 by Aurea Richards PT  Outcome: Progressing     PHYSICAL THERAPY EVALUATION    Patient: Charlotte Schwarz (73 y.o. female)  Date: 8/19/2024  Primary Diagnosis: Osteoarthritis of right knee [M17.11]  Arthritis of knee [M17.10]  Procedure(s) (LRB):  Right total knee arthroplasty (Right) Day of Surgery   Precautions: Fall Risk, General Precautions, Weight Bearing, Right Lower Extremity Weight Bearing: Weight Bearing As Tolerated,  ,  ,  ,  ,  ,      ASSESSMENT :  Pt resting on stretcher upon entering room, agreeable to PT evaluation after easily being awoken.  Pt is POD 0 for R TKA.  Pt required Zully to perform supine to sit transfer, difficulty keeping eyes open and not following commands due to fatigue and drowsiness.  Pt notes immediate urge to use the restroom, began ambulating to the bathroom, eyes closed and not properly using RW and difficulty following directions.  Pt required ModA to maintain balance while ambulating and for walker negotiating with max verbal and tactile cues.  Pt in slumped posture while

## 2024-08-19 NOTE — NURSE NAVIGATOR
Rounded on patient s/p right total knee replacement with Dr. Boles, dos 08/19/2024. She is now alert and oriented x 3, nausea and vomiting better. She denies chest pain or shortness of breath. She states that her pain is well controlled at present. She has quadricept in right leg. Ace wrap observed to right lower extremity, dressing underneath observed to be clean, dry and intact. Patient was provided with rolling walker, total knee replacement education book, medication education sheet and janell hose. She was reminded that janell hose are for daytime wear only to assist with swelling and should be removed every night. She was educated that she may use her ace wrap for compression during the day should the compression stockings become too tight.  Reviewed the use of incentive spirometry. Patient was instructed to use ten times hourly while in hospital and to continue use at home for the next few days to keep lungs expanded and free from complications. Reviewed postoperative showering instructions. Patient was reminded that she may shower tomorrow, no tubs or submersion in water. She is to contact clinic with any dressing issues. Encouraged hourly ambulation , short distances every hour to assist with stiffness. Encouraged icing 20 minutes every hour, not to be placed directly on on her skin to assist with swelling. Patient verbalized understanding of all information provided. She has a strong support system in place and all required DME at home. Patient has already obtained her postoperative medications and has voided on her own. She will discharge home with home physical therapy. Patient was able to ambulate the entire unit and navigate the stairs safely with the coordinator without difficulty. She has voided on her own. Patient is safe to discharge at this time.

## 2024-08-19 NOTE — NURSE NAVIGATOR
Rounded on patient s/p right total knee replacement with Dr. Boles, dos 08/19/2024. Patient observed to be sitting with eyes closed in bedside chair. She states that she feels horrible and very tired. Mar indicates patient has been medicated twice for nausea. Patient provided with lunch per her request , ate one chip and proceeded to vomit. Per patient she does not have a walker so one will be issued to her by the coordinator. Was going to review education with patient, but she is not feeling well at this time. PT has already ambulated her to the restroom, but has been unable to clear her because of tiredness and nausea. Will continue to monitor.

## 2024-08-21 ENCOUNTER — HOME CARE VISIT (OUTPATIENT)
Age: 74
End: 2024-08-21

## 2024-08-21 VITALS
OXYGEN SATURATION: 99 % | DIASTOLIC BLOOD PRESSURE: 88 MMHG | SYSTOLIC BLOOD PRESSURE: 138 MMHG | RESPIRATION RATE: 18 BRPM | HEART RATE: 73 BPM | TEMPERATURE: 98.2 F

## 2024-08-21 PROCEDURE — G0151 HHCP-SERV OF PT,EA 15 MIN: HCPCS

## 2024-08-21 PROCEDURE — 0221000100 HH NO PAY CLAIM PROCEDURE

## 2024-08-21 ASSESSMENT — ENCOUNTER SYMPTOMS
PAIN LOCATION - PAIN QUALITY: ACHE
DYSPNEA ACTIVITY LEVEL: AFTER AMBULATING 10 - 20 FT
CONSTIPATION: 1

## 2024-08-21 NOTE — HOME HEALTH
PT TKR eval/SOC  Pt is a 72 y/o female who is s/p R TKR on 8/19/24 by Dr. Boles.  Pt  is is now referred to home care PT for TKR PT protocol per Dr. Boles.  PMH: Abnormal EKG;  Arthritis; Hypertension.   PLOF:  ind and ambulatory;  retired;  lives with  in a 1 story house.   PREC:  WBAT RLE;  fall risk.  S:  Pt denies falls and reported change in meds since surgery.  Pt's goals are to walk independently and get R knee stronger.  Pt has FWW but prefers rollator walker.  O:  Medication reconciled and updated and no issues noted.     Pain:  See pain assessment.  Pt instructions: Pain control = take pain meds 1-2 hours prior to PT arrival. Discussed correct dosage and administration of high risk medication: Oxycodone = Instructed patient/caregiver to take exact amount of narcotics prescribed, signs and symptoms of oversedation, notify PT if oversedated.  May cause constipation, notify PT if no BM x 3 days;  antiplatelet:  Instructed patient/caregiver to notify PT of any signs and symptoms of antiplatelet adverse effects including SOB, bleeding longer/heavier with cuts, bruising, nose bleeds, and/or upset stomach.    Integumentary:  R knee incision with intact Aquacel dressing with 10% brown saturation;  Date of dressing change is on 8/26/24 or sooner if with >65% saturation.  Edema: RLE.  Pt instructions:  Edema control = elevate and apply ice to x 20 mins on and 40 minutes off to  R  knee, during awake time, apply skin barrier to maintain skin integrity..  Positioning:  keep R knee in extension at rest.  ROM:  R knee =  12 degrees ext and 75 degrees flexion.  All other peripheral joints = WNL.  MMT:   MMT  LLE and BUE = 4-5/5.   RLE  MMT deferred due to pain, but is able to bear weight on RLE.  Pt is unable to perform stable R SLR, indicating poor R quadriceps control.   Pt instruction;  Therex (15-20 reps, 3x/day) for TKR Protocol:  supine/semi-recline = ankle pumps, qs, gs, hs, SLR;  seated:  LAQ, knee

## 2024-08-23 ENCOUNTER — TELEPHONE (OUTPATIENT)
Facility: HOSPITAL | Age: 74
End: 2024-08-23

## 2024-08-23 NOTE — TELEPHONE ENCOUNTER
Call placed to patient, ID verified x 2. Patient is s/p  right total knee replacement with Dr. Boles, dos 08/19/2024. She denies chest pain, shortness of breath, nausea, vomiting, fever or chills. She denies any residual numbness in her right  lower extremity, she denies any difficulty with bowel or bladder. She states that pain has been well controlled. She reports ambulating hourly with her walker. She is icing to assist with pain and swelling, although she states that swelling has been minimal. She reports her dressing as dry and intact with just a little bit of blood on it, but not requiring a change at this point. She states that home physical therapy has been out working with her and that they said she is doing very well. She states that they told her she is where most people are at the two week point. She is very pleased with her results. She continues to work on her HEP. She has no questions or concerns at this time. She will follow up with Dr. Boles in two weeks or sooner if needed.

## 2024-08-24 ENCOUNTER — HOME CARE VISIT (OUTPATIENT)
Age: 74
End: 2024-08-24
Payer: MEDICARE

## 2024-08-24 PROCEDURE — G0157 HHC PT ASSISTANT EA 15: HCPCS

## 2024-08-25 ENCOUNTER — HOME CARE VISIT (OUTPATIENT)
Age: 74
End: 2024-08-25
Payer: MEDICARE

## 2024-08-25 VITALS
OXYGEN SATURATION: 99 % | SYSTOLIC BLOOD PRESSURE: 128 MMHG | DIASTOLIC BLOOD PRESSURE: 70 MMHG | HEART RATE: 70 BPM | TEMPERATURE: 98.9 F

## 2024-08-25 PROCEDURE — G0157 HHC PT ASSISTANT EA 15: HCPCS

## 2024-08-25 NOTE — HOME HEALTH
SUBJECTIVE:Patient reposrts getting better, pain 6/10 upon arrival  Falls-NO  Medication changes -No  No s/sx of infection- No  CAREGIVER INVOLVEMENT/ASSISTANCE NEEDED:Pt lives with spouse wo assists as needed with meals and household chores. Pt able to perform ADL care in sitting.    OBJECTIVE:  See interventions.  PATIENT EDUCATION PROVIDED THIS VISIT: Education provided to pt on pain management, fall prevention, and s/sx of infection.  Education and training on HEP to be done 3x day for increased strength and ROM  PATIENT RESPONSE TO EDUCATION PROVIDED: Pt able to reteach education and demonstrate exercises, continued training required    PATIENT RESPONSE TO TREATMENT: Patient required rest breaks following SLR and gait due to fatigue and increase in pain from 6/10 to 8/10. Pain subsided with rest break. Patient able to perform all activities and is motivated to improve and return to PLOF    ASSESSMENT OF PROGRESS TOWARD GOALS: :Patient is progressing towards goals as previously established in POC as made apparent by increased gait distance and increased strength with ability to perform increased reps of exercises. Pt required decreased assist for transfers.  Skillled PT intervention needed to address deficits in strength, gait and transfers and balance to improve functional mobility and safety and lower risk for falls    PLAN FOR NEXT VISIT: Stairs, outdoor gait, TUG  THE FOLLOWING DISCHARGE PLANNING WAS DISCUSSED WITH THE PATIENT/CAREGIVER: Continue with PT services daily per MD protocol. Discharge set for 9/3/24, Patient with verbal understanding.

## 2024-08-26 ENCOUNTER — HOME CARE VISIT (OUTPATIENT)
Age: 74
End: 2024-08-26
Payer: MEDICARE

## 2024-08-26 VITALS
SYSTOLIC BLOOD PRESSURE: 128 MMHG | DIASTOLIC BLOOD PRESSURE: 76 MMHG | TEMPERATURE: 97.8 F | OXYGEN SATURATION: 98 % | HEART RATE: 68 BPM

## 2024-08-26 PROCEDURE — G0157 HHC PT ASSISTANT EA 15: HCPCS

## 2024-08-27 ENCOUNTER — HOME CARE VISIT (OUTPATIENT)
Age: 74
End: 2024-08-27
Payer: MEDICARE

## 2024-08-27 VITALS
TEMPERATURE: 97.4 F | OXYGEN SATURATION: 99 % | HEART RATE: 76 BPM | RESPIRATION RATE: 17 BRPM | SYSTOLIC BLOOD PRESSURE: 122 MMHG | OXYGEN SATURATION: 99 % | HEART RATE: 76 BPM | SYSTOLIC BLOOD PRESSURE: 120 MMHG | DIASTOLIC BLOOD PRESSURE: 82 MMHG | TEMPERATURE: 98.7 F | RESPIRATION RATE: 17 BRPM | DIASTOLIC BLOOD PRESSURE: 70 MMHG

## 2024-08-27 PROCEDURE — G0157 HHC PT ASSISTANT EA 15: HCPCS

## 2024-08-27 NOTE — HOME HEALTH
SUBJECTIVE:Patient reported trouble sleeping, pain 5/10 upon arrival  Falls-NO  Medication changes -No  No s/sx of infection- No  CAREGIVER INVOLVEMENT/ASSISTANCE NEEDED:Pt lives with spouse wo assists as needed with meals and household chores. Pt able to perform ADL care in sitting.    OBJECTIVE:  See interventions.  PATIENT EDUCATION PROVIDED THIS VISIT: Education provided on improtance of wearing proper shoes for improved gait and balance  Education and training on HEP to be done 3x day for increased strength and ROM  PATIENT RESPONSE TO EDUCATION PROVIDED: Pt with verbal understanding of education and able to demonstrate exercises     TUG: performed in chair without armrest 3x, 27 sec 23 sec, 20 sec with rollator  PATIENT RESPONSE TO TREATMENT: Patient with decreased rest breaks during activity, pt stated decreased pain with outdoor gait but reported 5/10 at end of session.  ASSESSMENT OF PROGRESS TOWARD GOALS: :Patient is progressing towards goals as previously established in POC as made apparent by outdoor gait x 300' with rollator, S. Pt improved strength to allow for stair negotiation. TUG improved from 69 sec to 20 sec showing improved stability .  Skillled PT intervention needed to address deficits in strength, gait and transfers and balance to improve functional mobility and safety and lower risk for falls    PLAN FOR NEXT VISIT: Increase strength, ROM, review goals  THE FOLLOWING DISCHARGE PLANNING WAS DISCUSSED WITH THE PATIENT/CAREGIVER: Continue with PT services daily per MD protocol. Discharge set for 9/3/24, Patient with verbal understanding.

## 2024-08-28 ENCOUNTER — HOME CARE VISIT (OUTPATIENT)
Age: 74
End: 2024-08-28
Payer: MEDICARE

## 2024-08-28 PROCEDURE — G0157 HHC PT ASSISTANT EA 15: HCPCS

## 2024-08-28 NOTE — HOME HEALTH
SUBJECTIVE: Patient states that she is doing well today, notes that she ahs no new complaints or concerns. Denies any falls or changes in overall status at this time.     CAREGIVER INVOLVEMENT/ASSISTANCE NEEDED FOR: Patient is currently reliant on assistance for completion of most to all ADL's such as cooking, cleaning, bathing and dressing.     OBJECTIVE:  See interventions.    PATIENT RESPONSE TO TREATMENT: Patient encouraged again today with his progress demonstrated during todays session. Patient continues to remain well motivated and ready to return to prior level of function.    PATIENT LEVEL OF UNDERSTANDING OF EDUCATION PROVIDED: Pt verbalized understanding of all education provided during session today.     ASSESSMENT OF PROGRESS TOWARD GOALS: Patient was seen for PT follow up session for LE strengthening, gait training and transfer training. Gait completed >3lpas completed before rest break was required. Patient showing further distance today than compared to any previous session. Patient completed >500ft today before rest break was requried. Sit to stands from chair to stance without need for UE pushoff, patient showing better functional independence with this today showing ability to complete 10 reps with MOD I today. Patient completed stairs up and down with use of B UE handrails and without cuing required for correct sequencing of LE for correct and safest sequencing. Patient demosntrating improvement from SBA to MOD I today.    HOME EXERCISE PROGRAM: Re-viewed HEP with patient, educated on all exercises to be included and importance of this to help maintain all pogress made in PT thus far. PAtient verbalized understanding and noted that they would remain compliant. All questions answered in regards to HEP.     THE FOLLOWING DISCHARGE PLANNING WAS DISCUSSED WITH THE PATIENT/CAREGIVER: Patient to be D/C'ed from  PT when all goals have been met or progressed well towards at that time.     PLAN FOR NEXT

## 2024-08-29 ENCOUNTER — HOME CARE VISIT (OUTPATIENT)
Age: 74
End: 2024-08-29
Payer: MEDICARE

## 2024-08-29 VITALS
DIASTOLIC BLOOD PRESSURE: 72 MMHG | TEMPERATURE: 97.4 F | RESPIRATION RATE: 17 BRPM | SYSTOLIC BLOOD PRESSURE: 124 MMHG | OXYGEN SATURATION: 100 % | HEART RATE: 66 BPM

## 2024-08-29 PROCEDURE — G0157 HHC PT ASSISTANT EA 15: HCPCS

## 2024-08-29 ASSESSMENT — ENCOUNTER SYMPTOMS: PAIN LOCATION - PAIN QUALITY: ACHE

## 2024-08-29 NOTE — HOME HEALTH
SUBJECTIVE: Patient states that she is doing well today, notes that she ahs no new complaints or concerns. Denies any falls or changes in overall status at this time. Reports that her pain continues to average about a 6/10. She reports difficulty sleeping at night currently.     CAREGIVER INVOLVEMENT/ASSISTANCE NEEDED FOR: Patient is currently reliant on assistance for completion of most to all ADL's such as cooking, cleaning, bathing and dressing.     OBJECTIVE:  See interventions.    PATIENT RESPONSE TO TREATMENT: Patient encouraged again today with his progress demonstrated during todays session. Patient continues to remain well motivated and ready to return to prior level of function.    PATIENT LEVEL OF UNDERSTANDING OF EDUCATION PROVIDED: Patient provided education on use of SPC and correct sequencing for ease and safety of use. Also, provided education on transfers with use of SPC such as stairs and gait on uneven terrain. Patient able to demosntrate back said education and verbalized understanding.     ASSESSMENT OF PROGRESS TOWARD GOALS: Patient was seen for PT follow up session for LE strengthening, gait training and transfer training. Gait completed outdoors today with use of SPC, patient completing gait for the first time without use of FWW, patient completing one lap around her outside with slow and guarded pace. Patient required multiple stop breakd during gait today as her cane and LE's became out of sequence quite frequently. Advised patient to continue to practice and use SPC inside of home, but defer back to use of FWW when outside and out in community. AROM knee flexion= 105 in stance today with knee flexion stretch being performed at steps. Sit to stands from chair to stance without use of B UE pushoff requried with ability to complete 10 reps today for the first time without need for UE pushoff. Patient performing with MOD I for the first time today. Showing great improvement in function as she

## 2024-08-30 ENCOUNTER — HOME CARE VISIT (OUTPATIENT)
Age: 74
End: 2024-08-30
Payer: MEDICARE

## 2024-08-30 VITALS
RESPIRATION RATE: 20 BRPM | TEMPERATURE: 97.2 F | OXYGEN SATURATION: 99 % | SYSTOLIC BLOOD PRESSURE: 122 MMHG | DIASTOLIC BLOOD PRESSURE: 78 MMHG | HEART RATE: 67 BPM

## 2024-08-30 PROCEDURE — G0151 HHCP-SERV OF PT,EA 15 MIN: HCPCS

## 2024-08-30 ASSESSMENT — ENCOUNTER SYMPTOMS: PAIN LOCATION - PAIN QUALITY: SORENESS

## 2024-08-30 NOTE — HOME HEALTH
PT TKR reassessment:  S:  Pt denies falls and denies change in meds.  S/p R TKR on 24 by Dr. Boles.  O:  Pain:  R knee =  see pain assessment;  ind in pain management. Pt instruction: ice to R knee x 20 minutes on and 40 minutes off during awake time, with skin barrier to maintain skin integrity.   INtegumentary:  R knee  incision is with dry, clean, intact Mepilex dressing.  Dressing change = 9/3/24  PREC:  fall risk;  WBAT RLE.  Bed mobility:  ind.  Transfers:  sit <> stand  from multiple surfaces = SBA with SC.  Car and shower transfers to be addressed in future session.  Goal progressing.  Gait:  100 ft, SBA using SC;  exhibiting very mild antalgic gait pattern.   Goal progressing.  Endurance trnMWT =   100 ft  with SC; Goal progressing.  Therex/TKR HEP:  do exercises 3 x a day, ambulate every hour daytime;   reviewed exercises in semi recline and seated position x 15 reps:    ankle circles, qs, gs, hs, R SLR.  seated:  LAQ, knee bends.   standing holding on to counter x 15 reps = heel raises, alternating side kicks, march in place, alternating hs curls, front kicks, back kicks.  Goal progressing.  FTSTS:  15.7 sec;  indicative of improving BLE functional strength.  Goal progressing.  ROM R knee:  0 degrees extension, 96 degrees flexion.  Goal progressing.  TU.6 seconds with SC.  Indicates balance improvement but still a fall risk.  Goal progressing.  One flight indoor steps negotiation:  SBA holding on to railing and SC.  Goal progressing.  Pt education:  Fall risk reduction :  ask for supervision during transfers and ambulation, use AD, use non-skid shoes.   Patient level of understanding of education provided:  Verbalized understanding of TKR PT protocol.  Patient response to treatment:  tolerated well.  Caregiver involvement/assistance needed:  (name of caregiver) assists with iADLs, functional mobility, transportation.  A:  Improved R knee flexibility but still needs to work on  increased flexion.  Improved gait but needs to progress to SC use when able to do standing exercises, with one hand support during standing exercises (marching in place, alternating hs curls, alternating hip abd, alternating hip flexion).   Also needs to work on stair training for ingress/egress home and car/shower transfers.  P:  cont with PT 1d4 for TKR HEP, progressive gait, TKR HEP, front entry stair training.  Resume PT visits with SKYE Auguste.  F/u visit with ortho JR Clyde MAY is on 9/5/24.

## 2024-08-31 ENCOUNTER — HOME CARE VISIT (OUTPATIENT)
Age: 74
End: 2024-08-31
Payer: MEDICARE

## 2024-08-31 VITALS
HEART RATE: 62 BPM | DIASTOLIC BLOOD PRESSURE: 74 MMHG | SYSTOLIC BLOOD PRESSURE: 130 MMHG | OXYGEN SATURATION: 99 % | TEMPERATURE: 98.2 F

## 2024-08-31 PROCEDURE — G0157 HHC PT ASSISTANT EA 15: HCPCS

## 2024-08-31 ASSESSMENT — ENCOUNTER SYMPTOMS: PAIN LOCATION - PAIN QUALITY: ACHY

## 2024-09-01 ENCOUNTER — HOME CARE VISIT (OUTPATIENT)
Age: 74
End: 2024-09-01
Payer: MEDICARE

## 2024-09-01 VITALS
OXYGEN SATURATION: 98 % | HEART RATE: 65 BPM | SYSTOLIC BLOOD PRESSURE: 140 MMHG | DIASTOLIC BLOOD PRESSURE: 76 MMHG | TEMPERATURE: 98.5 F

## 2024-09-02 ENCOUNTER — HOME CARE VISIT (OUTPATIENT)
Age: 74
End: 2024-09-02
Payer: MEDICARE

## 2024-09-02 VITALS
HEART RATE: 72 BPM | TEMPERATURE: 99.3 F | DIASTOLIC BLOOD PRESSURE: 74 MMHG | OXYGEN SATURATION: 99 % | SYSTOLIC BLOOD PRESSURE: 135 MMHG

## 2024-09-03 ENCOUNTER — HOME CARE VISIT (OUTPATIENT)
Age: 74
End: 2024-09-03
Payer: MEDICARE

## 2024-09-03 VITALS
DIASTOLIC BLOOD PRESSURE: 64 MMHG | RESPIRATION RATE: 18 BRPM | OXYGEN SATURATION: 98 % | SYSTOLIC BLOOD PRESSURE: 118 MMHG | HEART RATE: 65 BPM | TEMPERATURE: 97.4 F

## 2024-09-03 ASSESSMENT — ENCOUNTER SYMPTOMS: PAIN LOCATION - PAIN QUALITY: ACHING

## 2024-09-03 NOTE — HOME HEALTH
SUBJECTIVE: \" I doing good this morning. It is still stiff in the morning and I need to use the walker.\" Denies any falls, is eating better and drinking water.    CAREGIVER INVOLVEMENT/ASSISTANCE NEEDED FOR:  is home and is assisting with meals, errands, and cleaning.  .  OBJECTIVE:  See interventions.    PATIENT EDUCATION PROVIDED THIS VISIT: 1. Perform HEP 3 times a day focusing on controlled motions vs speed. 2. Call MD in the [place] about meds. 3. Amb outside for distance with . 4. Patient may amb in the home without (A) device for short distances as long as she doesn't use any substitute motions.    PATIENT RESPONSE TO EDUCATION PROVIDED: Voiced that she is working on her exs and is icing.    PATIENT RESPONSE TO TREATMENT: Improved gait ability to amb without (A) device. Patient required rest in between the exs for water.  .  ASSESSMENT OF PROGRESS TOWARD GOALS: Patient is (I) on the steps inside the home using handrail and step to technique. ROM improved to 0-105. Review of goals:  (I) from sit to stand from multiple surfaces inclusing the car. Declined practicing in and out of the shower.  TUG-21.77 sec  (I) on the steps using SPC and rail for front steps and handrails for the steps in the home. Practiced X5 reps with good step to pattern.  No s/s of infection and dressing intact.  Vitals-WNL  Amb on uneven surfaces with SPC and mod(I) with improved stride length and heel strike. Good full extension in mid stance.    PLAN FOR NEXT VISIT: Review of goals for dc.     THE FOLLOWING DISCHARGE PLANNING WAS DISCUSSED WITH THE PATIENT/CAREGIVER: Dc on next visit. Patient is agreeable to the discharge and aware that OP therapy is the next step.

## 2024-09-04 NOTE — HOME HEALTH
PT TKR DC summary:    Pt is a 74 y/o  female who is  s/p R TKR  by Dr. Boles on 24 .    S:  Pt has pain on R knee.  Denies falls nor change in medication.  O:    Medication list updated and reconciled.  Integumentary:  R Knee  incision = newly epithelialized with intact staples;  Dressing change done using clean technique with sterile Mepilex dressing.   Pain: R  knee :   see pain assessment.   Ind pain management by patient/ caregiver.  Goal met.  ROM R knee:  0 degrees extension and 105  degrees  flexion.  Goal met.  TKR HEP:  ind;   continue doing HEP and ambulation program.  Goal met.  MMT:  RLE = 4-/5.  Goal met.   TU.8  sec with SC;   improved but continues to be a fall risk, goal met.   Fall risk reduction:  use sneaker type shoes instead of slippers, use AD, ask for supervision  bed mobiilty = ind .  Goal met    Transfers = ind to multiple surfaces;  shower  transfers = SBA, pt wants to take a shower after she sees ortho LALO Tang on Thursday;  car transfers = SBA with SPC. Goal achieved.    GAit: at her best = 700  ft,   ind with SPC  indoors exhibiting very mild antalgic gait pattern.  SBA for gait outdoor with SPC. Goal met.  6MWT:  300  ft  with SC  ;  indicative of  good walking endurance. Goal achieved.   Ingress/egress to home:  SBA via front entry steps holding on to railing and SPC.  Goal met.    One flight stairs negotiation to access 's home office:  SBA holding on to railing and SC.  Goal met    Patient level of understanding of education provided: verbalized.  Patient response to treatment:  tolerated well.  Caregiver involvement/assistance needed:  (name of caregiver) assists with self care, ADLs, iADLs, functional mobility, transportation.     A:  Pt has made significant progress with PT in  bed mob, transfers and gait.  Pt has  met PT goals and is ready to start with OP PT.    P:  DC PT and agency and pt agreed.   Pt will start with OP PT when ordered by ortho

## 2024-09-05 ENCOUNTER — OFFICE VISIT (OUTPATIENT)
Age: 74
End: 2024-09-05

## 2024-09-05 VITALS
DIASTOLIC BLOOD PRESSURE: 62 MMHG | SYSTOLIC BLOOD PRESSURE: 114 MMHG | RESPIRATION RATE: 17 BRPM | TEMPERATURE: 98.2 F | OXYGEN SATURATION: 100 % | HEART RATE: 73 BPM

## 2024-09-05 DIAGNOSIS — Z47.89 ORTHOPEDIC AFTERCARE: ICD-10-CM

## 2024-09-05 DIAGNOSIS — Z96.651 STATUS POST TOTAL RIGHT KNEE REPLACEMENT: Primary | ICD-10-CM

## 2024-09-05 DIAGNOSIS — G89.18 POST-OP PAIN: ICD-10-CM

## 2024-09-05 DIAGNOSIS — Z48.02 ENCOUNTER FOR STAPLE REMOVAL: ICD-10-CM

## 2024-09-05 PROCEDURE — 99024 POSTOP FOLLOW-UP VISIT: CPT | Performed by: PHYSICIAN ASSISTANT

## 2024-09-05 RX ORDER — OXYCODONE HYDROCHLORIDE 5 MG/1
5 TABLET ORAL EVERY 4 HOURS PRN
Qty: 42 TABLET | Refills: 0 | Status: SHIPPED | OUTPATIENT
Start: 2024-09-05 | End: 2024-09-12

## 2024-09-05 NOTE — PROGRESS NOTES
Patient: Charlotte Schwarz                MRN: [id number]       SSN: [id number]  YOB: 1950        AGE: 73 y.o.        SEX: female  There is no height or weight on file to calculate BMI.    PCP: Maite Santana MD  09/05/24      This office note has been dictated.      REVIEW OF SYSTEMS:  Constitutional: Negative for fever, chills, weight loss and malaise/fatigue.   HENT: Negative.    Eyes: Negative.    Respiratory: Negative.   Cardiovascular: Negative.   Gastrointestinal: No bowel incontinence or constipation.  Genitourinary: No bladder incontinence or saddle anesthesia.  Skin: Negative.   Neurological: Negative.    Endo/Heme/Allergies: Negative.    Psychiatric/Behavioral: Negative.  Musculoskeletal: As per HPI above.     Past Medical History:   Diagnosis Date    Abnormal EKG     Arthritis     Hypertension          Current Outpatient Medications:     oxyCODONE (ROXICODONE) 5 MG immediate release tablet, Take 1 tablet by mouth every 4 hours as needed for Pain for up to 7 days. Supervising provider- Dr. Jon Boles- HERMILO: [id number] Max Daily Amount: 30 mg, Disp: 42 tablet, Rfl: 0    atorvastatin (LIPITOR) 20 MG tablet, Take 20 mg by mouth daily., Disp: , Rfl:     vitamin D 25 MCG (1000 UT) CAPS, Take 1 capsule by mouth daily, Disp: , Rfl:     aspirin (ASPIRIN 81) 81 MG EC tablet, Take 1 tablet by mouth in the morning and at bedtime, Disp: 60 tablet, Rfl: 3    celecoxib (CELEBREX) 200 MG capsule, Take 1 capsule by mouth 2 times daily, Disp: 60 capsule, Rfl: 2    acetaminophen (TYLENOL) 500 MG tablet, Take 2 tablets by mouth every 8 hours as needed for Pain, Disp: 90 tablet, Rfl: 1    ondansetron (ZOFRAN) 4 MG tablet, Take 1 tablet by mouth every 8 hours as needed for Nausea or Vomiting, Disp: 30 tablet, Rfl: 2    naloxone (NARCAN) 4 MG/0.1ML LIQD nasal spray, 1 spray by Nasal route as needed for Opioid Reversal, Disp: 1 each, Rfl: 1    potassium chloride (K-TAB) 20 MEQ TBCR extended release

## 2024-09-11 ENCOUNTER — HOSPITAL ENCOUNTER (OUTPATIENT)
Facility: HOSPITAL | Age: 74
Setting detail: RECURRING SERIES
Discharge: HOME | End: 2024-09-14
Payer: MEDICARE

## 2024-09-11 PROCEDURE — 97110 THERAPEUTIC EXERCISES: CPT | Performed by: PHYSICAL THERAPIST

## 2024-09-11 PROCEDURE — 97162 PT EVAL MOD COMPLEX 30 MIN: CPT | Performed by: PHYSICAL THERAPIST

## 2024-09-12 ENCOUNTER — TELEPHONE (OUTPATIENT)
Age: 74
End: 2024-09-12

## 2024-09-12 RX ORDER — ACETAMINOPHEN 500 MG
1000 TABLET ORAL EVERY 8 HOURS PRN
Qty: 90 TABLET | Refills: 1 | Status: SHIPPED | OUTPATIENT
Start: 2024-09-12

## 2024-09-12 RX ORDER — CELECOXIB 200 MG/1
200 CAPSULE ORAL 2 TIMES DAILY
Qty: 60 CAPSULE | Refills: 2 | Status: SHIPPED | OUTPATIENT
Start: 2024-09-12

## 2024-09-13 ENCOUNTER — HOSPITAL ENCOUNTER (OUTPATIENT)
Facility: HOSPITAL | Age: 74
Setting detail: RECURRING SERIES
Discharge: HOME | End: 2024-09-16
Payer: MEDICARE

## 2024-09-13 PROCEDURE — 97140 MANUAL THERAPY 1/> REGIONS: CPT

## 2024-09-13 PROCEDURE — 97110 THERAPEUTIC EXERCISES: CPT

## 2024-09-13 PROCEDURE — 97112 NEUROMUSCULAR REEDUCATION: CPT

## 2024-09-16 ENCOUNTER — HOSPITAL ENCOUNTER (OUTPATIENT)
Facility: HOSPITAL | Age: 74
Setting detail: RECURRING SERIES
Discharge: HOME | End: 2024-09-19
Payer: MEDICARE

## 2024-09-16 PROCEDURE — 97530 THERAPEUTIC ACTIVITIES: CPT

## 2024-09-16 PROCEDURE — 97140 MANUAL THERAPY 1/> REGIONS: CPT

## 2024-09-16 PROCEDURE — 97110 THERAPEUTIC EXERCISES: CPT

## 2024-09-18 ENCOUNTER — HOSPITAL ENCOUNTER (OUTPATIENT)
Facility: HOSPITAL | Age: 74
Setting detail: RECURRING SERIES
Discharge: HOME | End: 2024-09-21
Payer: MEDICARE

## 2024-09-18 PROCEDURE — 97530 THERAPEUTIC ACTIVITIES: CPT

## 2024-09-18 PROCEDURE — 97110 THERAPEUTIC EXERCISES: CPT

## 2024-09-18 PROCEDURE — 97140 MANUAL THERAPY 1/> REGIONS: CPT

## 2024-09-19 ENCOUNTER — OFFICE VISIT (OUTPATIENT)
Age: 74
End: 2024-09-19

## 2024-09-19 DIAGNOSIS — G89.18 POST-OP PAIN: Primary | ICD-10-CM

## 2024-09-19 PROCEDURE — 99024 POSTOP FOLLOW-UP VISIT: CPT | Performed by: PHYSICIAN ASSISTANT

## 2024-09-19 RX ORDER — OXYCODONE HYDROCHLORIDE 5 MG/1
5 TABLET ORAL EVERY 4 HOURS PRN
Qty: 42 TABLET | Refills: 0 | Status: SHIPPED | OUTPATIENT
Start: 2024-09-19 | End: 2024-09-26

## 2024-09-23 ENCOUNTER — HOSPITAL ENCOUNTER (OUTPATIENT)
Facility: HOSPITAL | Age: 74
Setting detail: RECURRING SERIES
Discharge: HOME | End: 2024-09-26
Payer: MEDICARE

## 2024-09-23 PROCEDURE — 97110 THERAPEUTIC EXERCISES: CPT

## 2024-09-23 PROCEDURE — 97112 NEUROMUSCULAR REEDUCATION: CPT

## 2024-09-23 PROCEDURE — 97530 THERAPEUTIC ACTIVITIES: CPT

## 2024-09-25 ENCOUNTER — HOSPITAL ENCOUNTER (OUTPATIENT)
Facility: HOSPITAL | Age: 74
Setting detail: RECURRING SERIES
Discharge: HOME | End: 2024-09-28
Payer: MEDICARE

## 2024-09-25 PROCEDURE — 97112 NEUROMUSCULAR REEDUCATION: CPT | Performed by: PHYSICAL THERAPIST

## 2024-09-25 PROCEDURE — 97110 THERAPEUTIC EXERCISES: CPT | Performed by: PHYSICAL THERAPIST

## 2024-09-25 PROCEDURE — 97530 THERAPEUTIC ACTIVITIES: CPT | Performed by: PHYSICAL THERAPIST

## 2024-09-30 ENCOUNTER — TELEPHONE (OUTPATIENT)
Facility: HOSPITAL | Age: 74
End: 2024-09-30

## 2024-09-30 ENCOUNTER — HOSPITAL ENCOUNTER (OUTPATIENT)
Facility: HOSPITAL | Age: 74
Setting detail: RECURRING SERIES
End: 2024-09-30
Payer: MEDICARE

## 2024-09-30 NOTE — TELEPHONE ENCOUNTER
Called patient to inform them that their appt will be cxl due to provider being out. Patient rescheduled to thursday @1220pm

## 2024-10-02 ENCOUNTER — TELEPHONE (OUTPATIENT)
Age: 74
End: 2024-10-02

## 2024-10-02 DIAGNOSIS — G89.18 POST-OP PAIN: Primary | ICD-10-CM

## 2024-10-02 RX ORDER — OXYCODONE HYDROCHLORIDE 5 MG/1
5 TABLET ORAL EVERY 4 HOURS PRN
Qty: 42 TABLET | Refills: 0 | Status: SHIPPED | OUTPATIENT
Start: 2024-10-02 | End: 2024-10-09

## 2024-10-02 NOTE — TELEPHONE ENCOUNTER
Patient called in to request a refill on the medication Oxycodone    Please advise patient @ [phone]

## 2024-10-03 ENCOUNTER — HOSPITAL ENCOUNTER (OUTPATIENT)
Facility: HOSPITAL | Age: 74
Setting detail: RECURRING SERIES
Discharge: HOME | End: 2024-10-06
Payer: MEDICARE

## 2024-10-03 PROCEDURE — 97110 THERAPEUTIC EXERCISES: CPT

## 2024-10-03 PROCEDURE — 97530 THERAPEUTIC ACTIVITIES: CPT

## 2024-10-03 PROCEDURE — 97112 NEUROMUSCULAR REEDUCATION: CPT

## 2024-10-03 NOTE — PROGRESS NOTES
PHYSICAL / OCCUPATIONAL THERAPY - DAILY TREATMENT NOTE    Patient Name: Charlotte Schwarz    Date: 10/3/2024    : 1950  Insurance: Payor: MEDICARE / Plan: MEDICARE PART A AND B / Product Type: *No Product type* /      Patient  verified Yes     Visit #   Current / Total 8 24   Time   In / Out 12:18 12:56   Pain   In / Out 4 4   Subjective Functional Status/Changes: Pt. Has no new complaints today.     TREATMENT AREA =  Right knee pain [M25.561]     OBJECTIVE      Therapeutic Procedures:    50910 Therapeutic Exercise (timed):  increase ROM, strength, coordination, balance, and proprioception to improve patient's ability to progress to PLOF and address remaining functional goals.   Tx Min Billable or 1:1 Min   (if diff from Tx Min) Details:   15  See flow sheet as applicable     48151 Neuromuscular Re-Education (timed):  improve balance, coordination, kinesthetic sense, posture, core stability and proprioception to improve patient's ability to develop conscious control of individual muscles and awareness of position of extremities in order to progress to PLOF and address remaining functional goals.   Tx Min Billable or 1:1 Min   (if diff from Tx Min) Details:   8  See flow sheet as applicable     16866 Therapeutic Activity (timed):  use of dynamic activities replicating functional movements to increase ROM, strength, coordination, balance, and proprioception in order to improve patient's ability to progress to PLOF and address remaining functional goals.   Tx Min Billable or 1:1 Min   (if diff from Tx Min) Details:   15  See flow sheet as applicable and Pt. Education on form/stair negotiation, knee pain.       38  [facility] Totals Reminder: bill using total billable min of TIMED therapeutic procedures (example: do not include dry needle or estim unattended, both untimed codes, in totals to left)  8-22 min = 1 unit; 23-37 min = 2 units; 38-52 min = 3 units; 53-67 min = 4 units; 68-82 min = 5 units   Total Total

## 2024-10-07 ENCOUNTER — HOSPITAL ENCOUNTER (OUTPATIENT)
Facility: HOSPITAL | Age: 74
Setting detail: RECURRING SERIES
Discharge: HOME | End: 2024-10-10
Payer: MEDICARE

## 2024-10-07 PROCEDURE — 97530 THERAPEUTIC ACTIVITIES: CPT

## 2024-10-07 PROCEDURE — 97112 NEUROMUSCULAR REEDUCATION: CPT

## 2024-10-07 PROCEDURE — 97110 THERAPEUTIC EXERCISES: CPT

## 2024-10-07 NOTE — PROGRESS NOTES
PHYSICAL / OCCUPATIONAL THERAPY - DAILY TREATMENT NOTE    Patient Name: Charlotte Schwarz    Date: 10/7/2024    : 1950  Insurance: Payor: MEDICARE / Plan: MEDICARE PART A AND B / Product Type: *No Product type* /      Patient  verified Yes     Visit #   Current / Total 9 24   Time   In / Out 1139 1222   Pain   In / Out 4 3   Subjective Functional Status/Changes: Patient reports that she is now taking 3 medications for pain instead of 4 (from the SOC). She states that her soreness after her last visit only lasted for a short while. Patient states that her primary challenge is reciprocal stair negotiation. Her steps at home are approximately 8\".      TREATMENT AREA =  Right knee pain [M25.561]     OBJECTIVE      Therapeutic Procedures:    42324 Therapeutic Exercise (timed):  increase ROM, strength, coordination, balance, and proprioception to improve patient's ability to progress to PLOF and address remaining functional goals.   Tx Min Billable or 1:1 Min   (if diff from Tx Min) Details:   13 13 See flow sheet as applicable     67399 Neuromuscular Re-Education (timed):  improve balance, coordination, kinesthetic sense, posture, core stability and proprioception to improve patient's ability to develop conscious control of individual muscles and awareness of position of extremities in order to progress to PLOF and address remaining functional goals.   Tx Min Billable or 1:1 Min   (if diff from Tx Min) Details:   10 10 See flow sheet as applicable     16188 Therapeutic Activity (timed):  use of dynamic activities replicating functional movements to increase ROM, strength, coordination, balance, and proprioception in order to improve patient's ability to progress to PLOF and address remaining functional goals.   Tx Min Billable or 1:1 Min   (if diff from Tx Min) Details:   20 20 See flow sheet        43 43 [facility] Totals Reminder: bill using total billable min of TIMED therapeutic procedures (example: do not

## 2024-10-09 ENCOUNTER — HOSPITAL ENCOUNTER (OUTPATIENT)
Facility: HOSPITAL | Age: 74
Setting detail: RECURRING SERIES
Discharge: HOME | End: 2024-10-12
Payer: MEDICARE

## 2024-10-09 PROCEDURE — 97110 THERAPEUTIC EXERCISES: CPT

## 2024-10-09 PROCEDURE — 97140 MANUAL THERAPY 1/> REGIONS: CPT

## 2024-10-09 PROCEDURE — 97112 NEUROMUSCULAR REEDUCATION: CPT

## 2024-10-09 PROCEDURE — 97530 THERAPEUTIC ACTIVITIES: CPT

## 2024-10-09 NOTE — PROGRESS NOTES
[place]   10/9/2024 11:00 [facility]   10/10/2024  1:00 PM Guerrero Tang PA-C [facility]

## 2024-10-09 NOTE — THERAPY DISCHARGE
[facility]  [address] [address] Ph: [phone] Fx: [phone]  DISCHARGE SUMMARY  Patient Name: Charlotte Schwarz : 1950   Treatment/Medical Diagnosis: Right knee pain [M25.561]   Referral Source: Guerrero Tang PA-C     Date of Initial Visit:  Attended Visits: 10 Missed Visits: 2     SUMMARY OF TREATMENT  Charlotte has been attending OP PT for treatment of her right knee pain post TKA. Initially, presents with an antalgic gait, FWB, with use of a SPC. She had decreased knee ROM, and loss of LE strength, with step to pattern with stair navigation. Currently, pt has lower pain ratings, but continues to have knee pain. Pt is lacking full knee extension and flexion ROM, and lacking full LE strength. Pt continues to have knee pain with stair navigation but she is able to perform reciprocal gait pattern with stair navigation. PT treatments include therapeutic exercise to address strength deficits, therapeutic activities to improve functional mobility, neuromuscular re-education to address balance, coordination and proprioception, manual therapy to address ROM and tissue extensibility and modalities as indicated. Pt is choosing to end PT treatment at this time due to personal time constraints.     CURRENT STATUS  Pt can do a STS without knee pain     LE MMT: hip flexion R 4/ L 5, Knee ext R 4 and painful /L 5, Knee flexion R 4+ /L 5, Hip abd R 4 /L 4, Knee flexion R 110 /L 131, knee ext R -3 /L+2    Short Term Goals: To be accomplished in 2 WEEKS  1.  Patient will become proficient in their HEP and will be compliant in performing that program.  Evaluation:   Patient given a written/illustrated HEP.  Current: pt does her HEP daily 10/9/24   Goal Met?  Yes    Long Term Goals: To be accomplished in 8 WEEKS  1. Patient's pain level will be 2-3/10 with activity in order to improve patient's ability to perform normal ADLs.  Evaluation:

## 2024-10-10 ENCOUNTER — OFFICE VISIT (OUTPATIENT)
Age: 74
End: 2024-10-10
Payer: MEDICARE

## 2024-10-10 VITALS — BODY MASS INDEX: 25.25 KG/M2 | HEIGHT: 59 IN

## 2024-10-10 DIAGNOSIS — Z47.89 ORTHOPEDIC AFTERCARE: ICD-10-CM

## 2024-10-10 DIAGNOSIS — Z96.651 STATUS POST RIGHT KNEE REPLACEMENT: Primary | ICD-10-CM

## 2024-10-10 PROCEDURE — 73562 X-RAY EXAM OF KNEE 3: CPT | Performed by: PHYSICIAN ASSISTANT

## 2024-10-10 PROCEDURE — 99024 POSTOP FOLLOW-UP VISIT: CPT | Performed by: PHYSICIAN ASSISTANT

## 2024-10-10 RX ORDER — CEPHALEXIN 500 MG/1
500 CAPSULE ORAL 4 TIMES DAILY
Qty: 28 CAPSULE | Refills: 0 | Status: SHIPPED | OUTPATIENT
Start: 2024-10-10 | End: 2024-10-10 | Stop reason: CLARIF

## 2024-10-10 NOTE — PROGRESS NOTES
Housing Stability: Low Risk  (8/19/2024)    Housing Stability Vital Sign     Unable to Pay for Housing in the Last Year: No     Number of Times Moved in the Last Year: 1     Homeless in the Last Year: No       Past Surgical History:   Procedure Laterality Date    FOOT/TOES SURGERY PROC UNLISTED      TOTAL KNEE ARTHROPLASTY Right 8/19/2024    Right total knee arthroplasty performed by Jon Boles MD at [facility] MAIN OR         Patient seen evaluated today for her right total knee replacement.  She is now about 7 weeks status post surgery and she is progressing well.  She has done very well with her knee replacement.  She is having minimal discomfort.  She has pain medicine at home.  She is been in outpatient physical therapy but needs to stop as she has obligations to watch her grandchild.  She has had no recent injuries or falls.  No fevers or chills.    Patient denies recent fevers, chills, chest pain, SOB, or injuries.   No recent systemic changes noted.  A 12-point review of systems is performed today.  Pertinent positives are noted.  All other systems reviewed and otherwise are negative.    Physical exam: General: Alert and oriented x3, nad.  well-developed, well nourished.  normal affect, AF.  NC/AT, EOMI, neck supple, trachea midline, no JVD present. Breathing is non-labored.  Examination of the right knee reveals skin intact.  There is no erythema or ecchymosis noted.  There are no signs for infection or cellulitis present.  She has full range of motion.  Excellent stability.  Patella tracks nicely without rubs or crepitus noted.    Radiographs obtained the office today 10/10/2024 at the [facility] location including AP, lateral, skyline shows the total knee components to be well-fixed without evidence for loosening or fracture noted.    Assessment: Status post right total knee replacement    Plan: At this point, we discussed treatment options.  The patient will continue with a home exercise program.  She

## 2024-10-21 ENCOUNTER — TELEPHONE (OUTPATIENT)
Age: 74
End: 2024-10-21

## 2024-10-21 DIAGNOSIS — G89.18 POST-OP PAIN: Primary | ICD-10-CM

## 2024-10-21 RX ORDER — HYDROCODONE BITARTRATE AND ACETAMINOPHEN 7.5; 325 MG/1; MG/1
1 TABLET ORAL EVERY 6 HOURS PRN
Qty: 28 TABLET | Refills: 0 | Status: SHIPPED | OUTPATIENT
Start: 2024-10-21 | End: 2024-10-28

## 2024-10-21 NOTE — TELEPHONE ENCOUNTER
Patient is requesting a refill of oxycodone to be sent to [facility] on [facility].    Patient can be reached at [phone].

## 2024-10-21 NOTE — TELEPHONE ENCOUNTER
Patient called to confirm that today's prescription for Norco is correct, she thought she was getting Oxycodone.  Her pharmacy is holding prescription until it's confirmed.  Please contact patient and/or pharmacy to advise, patient can be reached at [phone].

## 2024-10-22 NOTE — TELEPHONE ENCOUNTER
Spoke with patient and informed that yes the rx LALO Tang sent was the correct rx and he wants her to continue with thst rx now. Patient understood

## 2024-10-22 NOTE — TELEPHONE ENCOUNTER
Patient called again, asking for confirmation that she is supposed to take the Norco now instead of the Oxycodone she has been taking. She just wants to check before she fills it as she has never taken Norco before.    Please review and advise patient, [phone].

## 2024-10-28 ENCOUNTER — TELEPHONE (OUTPATIENT)
Age: 74
End: 2024-10-28

## 2024-10-28 DIAGNOSIS — G89.18 POST-OP PAIN: Primary | ICD-10-CM

## 2024-10-28 RX ORDER — TRAMADOL HYDROCHLORIDE 50 MG/1
50 TABLET ORAL EVERY 6 HOURS PRN
Qty: 28 TABLET | Refills: 0 | Status: SHIPPED | OUTPATIENT
Start: 2024-10-28 | End: 2024-11-04

## 2024-10-28 NOTE — TELEPHONE ENCOUNTER
Post op patient called and said that she was prescribed some Hydrocodone 7.5-325 mg medication on 10/21/24.    Patient said that this past Friday 10/25/24, she threw up, and since then she has been feeing Nausea and dizzy.     Patient said the Hydrocodone does work with the the other medications that were prescribed, but it makes her sick.    Patient is asking if  could prescribe her a different medication.    [facility] pharmacy on [address] [address]  Tel.[phone].    Patient tel [phone].    Note : patient as an appt already schedule to see LALO Tang on 11/14/24 for the Right knee.

## 2024-11-06 ENCOUNTER — TELEPHONE (OUTPATIENT)
Age: 74
End: 2024-11-06

## 2024-11-06 DIAGNOSIS — G89.18 POST-OP PAIN: Primary | ICD-10-CM

## 2024-11-06 NOTE — TELEPHONE ENCOUNTER
Patient requesting refill of   traMADol (ULTRAM) 50 MG tablet called in to [facility] on [address].

## 2024-11-07 RX ORDER — TRAMADOL HYDROCHLORIDE 50 MG/1
50 TABLET ORAL EVERY 6 HOURS PRN
Qty: 28 TABLET | Refills: 0 | Status: SHIPPED | OUTPATIENT
Start: 2024-11-07 | End: 2024-11-14

## 2024-11-07 NOTE — TELEPHONE ENCOUNTER
Pt called stating she will be out of meds today and will not have any for tomorrow and is requesting RX to be sent to pharmacy today please.    RX:  traMADol (ULTRAM) 50 MG tablet     Pharmacy:  [facility]/pharmacy #[id number] - [place] - [address] - P [phone] - F [phone]    callback # [phone]

## 2024-11-14 ENCOUNTER — OFFICE VISIT (OUTPATIENT)
Age: 74
End: 2024-11-14

## 2024-11-14 DIAGNOSIS — G89.18 POST-OP PAIN: Primary | ICD-10-CM

## 2024-11-14 DIAGNOSIS — Z96.651 STATUS POST RIGHT KNEE REPLACEMENT: ICD-10-CM

## 2024-11-14 PROCEDURE — 99024 POSTOP FOLLOW-UP VISIT: CPT | Performed by: PHYSICIAN ASSISTANT

## 2024-11-14 RX ORDER — TRAMADOL HYDROCHLORIDE 50 MG/1
50 TABLET ORAL EVERY 6 HOURS PRN
Qty: 28 TABLET | Refills: 0 | Status: SHIPPED | OUTPATIENT
Start: 2024-11-14 | End: 2024-11-21

## 2024-11-14 NOTE — PROGRESS NOTES
will continue activities as tolerated.  She will continue with a home exercise program to maintain her mobility and range of motion.  She will go back on her ibuprofen.  She is instructed to stop Celebrex.  A prescription for tramadol be sent to the pharmacy.  She is instructed on use and precautions.  We will see her back in 3 months time for reevaluation.    Care plan outlined and precautions discussed. Radiographs and Results were reviewed with the patient.  Medications were reviewed with the patient. All of pt's questions and concerns were addressed.  Increasing symptoms and return precautions associated with chief complaint and evaluation were reviewed with the patient in detail.  The patient demonstrated adequate understanding.  Social determinents of health were discussed and did not alter treatment plan.            JR Clyde ROBLES, PAVonC, ATC      Note:  This note was generated with voice recognition software.  Any typographical/grammatical errors are unintentional.

## 2024-11-29 ENCOUNTER — TELEPHONE (OUTPATIENT)
Age: 74
End: 2024-11-29

## 2024-11-29 NOTE — TELEPHONE ENCOUNTER
Patient is requesting a refill on       traMADol (ULTRAM) 50 MG tablet         [facility]   [address] [place] [id number]  Phone: [phone]  Fax: [phone]

## 2024-12-02 NOTE — TELEPHONE ENCOUNTER
Spoke with patient informed per caitlin wu no further meds can be called in only otc meds as needed. Patient verbalized understanding

## 2024-12-04 ENCOUNTER — TELEPHONE (OUTPATIENT)
Age: 74
End: 2024-12-04

## 2024-12-04 NOTE — TELEPHONE ENCOUNTER
pt states the OTC Tylenol Extra Strength is not helping her at all. Pt is requesting a RX be sent to pharmacy for Tylenol prescription dosage. She thinks its called Tylenol #5 or whatever RX JRM can send for Tylenol for her.    Pharmacy:  [facility]/PHARMACY #[id number] - [facility] [address] - P [phone] - F [phone]     callback # [phone]

## 2025-02-10 ENCOUNTER — TELEPHONE (OUTPATIENT)
Age: 75
End: 2025-02-10

## 2025-02-10 NOTE — TELEPHONE ENCOUNTER
Patient is requesting a refill on         ibuprofen (ADVIL;MOTRIN) 800 MG tablet         [facility]   [address] [place] [id number]  Phone: [phone]  Fax: [phone]

## 2025-02-12 RX ORDER — IBUPROFEN 800 MG/1
800 TABLET, FILM COATED ORAL
Qty: 180 TABLET | Refills: 0 | Status: SHIPPED | OUTPATIENT
Start: 2025-02-12

## 2025-02-27 ENCOUNTER — OFFICE VISIT (OUTPATIENT)
Age: 75
End: 2025-02-27

## 2025-02-27 VITALS — BODY MASS INDEX: 25.25 KG/M2 | HEIGHT: 59 IN

## 2025-02-27 DIAGNOSIS — Z96.651 STATUS POST RIGHT KNEE REPLACEMENT: Primary | ICD-10-CM

## 2025-02-27 NOTE — PROGRESS NOTES
Patient: Charlotte Schwarz                MRN: [id number]       SSN: [id number]  YOB: 1950        AGE: 74 y.o.        SEX: female  Body mass index is 25.25 kg/m².    PCP: Maite Santana MD  02/27/25      This office note has been dictated.      REVIEW OF SYSTEMS:  Constitutional: Negative for fever, chills, weight loss and malaise/fatigue.   HENT: Negative.    Eyes: Negative.    Respiratory: Negative.   Cardiovascular: Negative.   Gastrointestinal: No bowel incontinence or constipation.  Genitourinary: No bladder incontinence or saddle anesthesia.  Skin: Negative.   Neurological: Negative.    Endo/Heme/Allergies: Negative.    Psychiatric/Behavioral: Negative.  Musculoskeletal: As per HPI above.     Past Medical History:   Diagnosis Date    Abnormal EKG     Arthritis     Hypertension          Current Outpatient Medications:     ibuprofen (ADVIL;MOTRIN) 800 MG tablet, Take 1 tablet by mouth 3 times daily (with meals), Disp: 180 tablet, Rfl: 0    acetaminophen (TYLENOL) 500 MG tablet, Take 2 tablets by mouth every 8 hours as needed for Pain, Disp: 90 tablet, Rfl: 1    celecoxib (CELEBREX) 200 MG capsule, Take 1 capsule by mouth 2 times daily, Disp: 60 capsule, Rfl: 2    atorvastatin (LIPITOR) 20 MG tablet, Take 1 tablet by mouth daily, Disp: , Rfl:     vitamin D 25 MCG (1000 UT) CAPS, Take 1 capsule by mouth daily, Disp: , Rfl:     aspirin (ASPIRIN 81) 81 MG EC tablet, Take 1 tablet by mouth in the morning and at bedtime, Disp: 60 tablet, Rfl: 3    celecoxib (CELEBREX) 200 MG capsule, Take 1 capsule by mouth 2 times daily, Disp: 60 capsule, Rfl: 2    acetaminophen (TYLENOL) 500 MG tablet, Take 2 tablets by mouth every 8 hours as needed for Pain, Disp: 90 tablet, Rfl: 1    ondansetron (ZOFRAN) 4 MG tablet, Take 1 tablet by mouth every 8 hours as needed for Nausea or Vomiting, Disp: 30 tablet, Rfl: 2    naloxone (NARCAN) 4 MG/0.1ML LIQD nasal spray, 1 spray by Nasal route as needed for Opioid

## 2025-03-25 RX ORDER — IBUPROFEN 800 MG/1
800 TABLET, FILM COATED ORAL
Qty: 180 TABLET | Refills: 5 | Status: SHIPPED | OUTPATIENT
Start: 2025-03-25

## (undated) DEVICE — Device

## (undated) DEVICE — Device: Brand: JELCO

## (undated) DEVICE — BNDG,ELSTC,MATRIX,STRL,6"X5YD,LF,HOOK&LP: Brand: MEDLINE

## (undated) DEVICE — DISPOSABLE MULTI BAG ADAPTERS Y                                    TUBING, STERILE, 2 TO A SET 6 SETS                                    PER BOX

## (undated) DEVICE — KIT OR TURNOVER

## (undated) DEVICE — STRYKER PERFORMANCE SERIES SAGITTAL BLADE: Brand: STRYKER PERFORMANCE SERIES

## (undated) DEVICE — SUTURE VICRYL SZ 0 L36IN ABSRB UD L36MM CT-1 1/2 CIR J946H

## (undated) DEVICE — WEREWOLF FASTSEAL 6.0 HEMOSTASIS WAND: Brand: FASTSEAL 6.0 HEMOSTASIS WAND

## (undated) DEVICE — GOWN ,SIRUS ,NONREINFORCED 4XL: Brand: MEDLINE

## (undated) DEVICE — HANDPIECE SET WITH HIGH FLOW TIP AND SUCTION TUBE: Brand: INTERPULSE

## (undated) DEVICE — 4-PORT MANIFOLD: Brand: NEPTUNE 2

## (undated) DEVICE — SPONGE LAP W18XL18IN WHT COT 4 PLY FLD STRUNG RADPQ DISP ST 2 PER PACK

## (undated) DEVICE — APPLICATOR MEDICATED 26 CC SOLUTION HI LT ORNG CHLORAPREP

## (undated) DEVICE — SOLUTION IRRIG 3000ML 0.9% SOD CHL FLX CONT 0797208] ICU MEDICAL INC]

## (undated) DEVICE — SUTURE ABSORBABLE ANTIBACT 1-0 CT-1 24 IN STRATAFIX PDS + SXPP1A443

## (undated) DEVICE — SOLUTION IRRIG 1000ML 0.9% SOD CHL USP POUR PLAS BTL

## (undated) DEVICE — BOWL AND CEMENT CARTRIDGE WITH BREAKAWAY FEMORAL NOZZLE: Brand: ACM

## (undated) DEVICE — GENESIS TROCHLEAR PIN 1/8 X 3: Brand: GENESIS

## (undated) DEVICE — BLADE,STAINLESS-STEEL,15,STRL,DISPOSABLE: Brand: MEDLINE

## (undated) DEVICE — 3M™ STERI-DRAPE™ INSTRUMENT POUCH 1018: Brand: STERI-DRAPE™

## (undated) DEVICE — PREMIUM DRY TRAY LF: Brand: MEDLINE INDUSTRIES, INC.

## (undated) DEVICE — ELECTRODE PT RET AD L9FT HI MOIST COND ADH HYDRGEL CORDED

## (undated) DEVICE — ZIMMER® STERILE DISPOSABLE TOURNIQUET CUFF WITH PLC, DUAL PORT, SINGLE BLADDER, 34 IN. (86 CM)

## (undated) DEVICE — BLADE,STAINLESS-STEEL,10,STRL,DISPOSABLE: Brand: MEDLINE

## (undated) DEVICE — TAPE,CLOTH/SILK,CURAD,3"X10YD,LF,40/CS: Brand: CURAD

## (undated) DEVICE — SKIN MARKER,REGULAR TIP WITH RULER AND LABELS: Brand: DEVON

## (undated) DEVICE — PACK SURG BSHR TOT KNEE LF

## (undated) DEVICE — SUTURE MONOCRYL SZ 2-0 L36IN ABSRB UD L36MM CT-1 1/2 CIR Y945H

## (undated) DEVICE — 450 ML BOTTLE OF 0.05% CHLORHEXIDINE GLUCONATE IN 99.95% STERILE WATER FOR IRRIGATION, USP AND APPLICATOR.: Brand: IRRISEPT ANTIMICROBIAL WOUND LAVAGE

## (undated) DEVICE — SUTURE VICRYL SZ 2 L27IN ABSRB VLT L65MM TP-1 1/2 CIR J649G

## (undated) DEVICE — SYRINGE MED 30ML STD CLR PLAS LUERLOCK TIP N CTRL DISP

## (undated) DEVICE — SYRINGE MED 3ML NDL 22GA L1 1/2IN REG BVL SFGLDE